# Patient Record
Sex: FEMALE | Race: WHITE | NOT HISPANIC OR LATINO | Employment: OTHER | URBAN - METROPOLITAN AREA
[De-identification: names, ages, dates, MRNs, and addresses within clinical notes are randomized per-mention and may not be internally consistent; named-entity substitution may affect disease eponyms.]

---

## 2017-01-16 ENCOUNTER — OFFICE VISIT - RIVER FALLS (OUTPATIENT)
Dept: FAMILY MEDICINE | Facility: CLINIC | Age: 57
End: 2017-01-16

## 2017-01-16 ASSESSMENT — MIFFLIN-ST. JEOR: SCORE: 1293.44

## 2017-01-21 ENCOUNTER — TRANSFERRED RECORDS (OUTPATIENT)
Dept: HEALTH INFORMATION MANAGEMENT | Facility: CLINIC | Age: 57
End: 2017-01-21

## 2017-01-21 LAB — HPV ABSTRACT: NORMAL

## 2017-01-27 ENCOUNTER — OFFICE VISIT - RIVER FALLS (OUTPATIENT)
Dept: FAMILY MEDICINE | Facility: CLINIC | Age: 57
End: 2017-01-27

## 2017-01-27 ASSESSMENT — MIFFLIN-ST. JEOR: SCORE: 1293.44

## 2017-02-13 ENCOUNTER — OFFICE VISIT - RIVER FALLS (OUTPATIENT)
Dept: FAMILY MEDICINE | Facility: CLINIC | Age: 57
End: 2017-02-13

## 2017-04-03 ENCOUNTER — OFFICE VISIT - RIVER FALLS (OUTPATIENT)
Dept: FAMILY MEDICINE | Facility: CLINIC | Age: 57
End: 2017-04-03

## 2017-04-03 ASSESSMENT — MIFFLIN-ST. JEOR: SCORE: 1302.51

## 2017-09-18 ENCOUNTER — OFFICE VISIT - RIVER FALLS (OUTPATIENT)
Dept: FAMILY MEDICINE | Facility: CLINIC | Age: 57
End: 2017-09-18

## 2017-10-12 ENCOUNTER — OFFICE VISIT - RIVER FALLS (OUTPATIENT)
Dept: FAMILY MEDICINE | Facility: CLINIC | Age: 57
End: 2017-10-12

## 2017-10-12 ENCOUNTER — COMMUNICATION - RIVER FALLS (OUTPATIENT)
Dept: FAMILY MEDICINE | Facility: CLINIC | Age: 57
End: 2017-10-12

## 2018-01-16 ENCOUNTER — OFFICE VISIT - RIVER FALLS (OUTPATIENT)
Dept: FAMILY MEDICINE | Facility: CLINIC | Age: 58
End: 2018-01-16

## 2018-01-17 LAB
CREAT SERPL-MCNC: 0.71 MG/DL (ref 0.5–1.05)
GLUCOSE BLD-MCNC: 114 MG/DL (ref 65–99)

## 2018-10-24 ENCOUNTER — OFFICE VISIT - RIVER FALLS (OUTPATIENT)
Dept: FAMILY MEDICINE | Facility: CLINIC | Age: 58
End: 2018-10-24

## 2018-10-24 ASSESSMENT — MIFFLIN-ST. JEOR: SCORE: 1261.69

## 2019-03-28 ENCOUNTER — OFFICE VISIT - RIVER FALLS (OUTPATIENT)
Dept: FAMILY MEDICINE | Facility: CLINIC | Age: 59
End: 2019-03-28

## 2019-03-28 ASSESSMENT — MIFFLIN-ST. JEOR: SCORE: 1270.76

## 2019-03-30 LAB
BUN SERPL-MCNC: 11 MG/DL (ref 7–25)
BUN/CREAT RATIO - HISTORICAL: ABNORMAL (ref 6–22)
CALCIUM SERPL-MCNC: 9.7 MG/DL (ref 8.6–10.4)
CHLORIDE BLD-SCNC: 102 MMOL/L (ref 98–110)
CHOLEST SERPL-MCNC: 264 MG/DL
CHOLEST/HDLC SERPL: 5.1 {RATIO}
CO2 SERPL-SCNC: 27 MMOL/L (ref 20–32)
CREAT SERPL-MCNC: 0.61 MG/DL (ref 0.5–1.05)
EGFRCR SERPLBLD CKD-EPI 2021: 99 ML/MIN/1.73M2
GLUCOSE BLD-MCNC: 111 MG/DL (ref 65–99)
HBA1C MFR BLD: 7.9 %
HDLC SERPL-MCNC: 52 MG/DL
LDLC SERPL CALC-MCNC: 179 MG/DL
NONHDLC SERPL-MCNC: 212 MG/DL
POTASSIUM BLD-SCNC: 3.9 MMOL/L (ref 3.5–5.3)
SODIUM SERPL-SCNC: 138 MMOL/L (ref 135–146)
TRIGL SERPL-MCNC: 176 MG/DL

## 2019-04-18 ENCOUNTER — OFFICE VISIT - RIVER FALLS (OUTPATIENT)
Dept: FAMILY MEDICINE | Facility: CLINIC | Age: 59
End: 2019-04-18

## 2019-04-18 ASSESSMENT — MIFFLIN-ST. JEOR: SCORE: 1257.15

## 2019-05-30 ENCOUNTER — OFFICE VISIT - RIVER FALLS (OUTPATIENT)
Dept: FAMILY MEDICINE | Facility: CLINIC | Age: 59
End: 2019-05-30

## 2019-05-30 ASSESSMENT — MIFFLIN-ST. JEOR: SCORE: 1237.19

## 2019-09-14 ENCOUNTER — AMBULATORY - RIVER FALLS (OUTPATIENT)
Dept: FAMILY MEDICINE | Facility: CLINIC | Age: 59
End: 2019-09-14

## 2019-09-15 ENCOUNTER — COMMUNICATION - RIVER FALLS (OUTPATIENT)
Dept: FAMILY MEDICINE | Facility: CLINIC | Age: 59
End: 2019-09-15

## 2019-09-15 LAB
CHOLEST SERPL-MCNC: 229 MG/DL
CHOLEST/HDLC SERPL: 4.2 {RATIO}
CREAT UR-MCNC: 30 MG/DL (ref 20–275)
HBA1C MFR BLD: 5.8 %
HDLC SERPL-MCNC: 54 MG/DL
LDLC SERPL CALC-MCNC: 144 MG/DL
MICROALBUMIN UR-MCNC: <0.2 MG/DL
MICROALBUMIN/CREAT UR: NORMAL MG/G{CREAT}
NONHDLC SERPL-MCNC: 175 MG/DL
TRIGL SERPL-MCNC: 174 MG/DL

## 2019-09-19 ENCOUNTER — OFFICE VISIT - RIVER FALLS (OUTPATIENT)
Dept: FAMILY MEDICINE | Facility: CLINIC | Age: 59
End: 2019-09-19

## 2020-06-26 ENCOUNTER — AMBULATORY - RIVER FALLS (OUTPATIENT)
Dept: FAMILY MEDICINE | Facility: CLINIC | Age: 60
End: 2020-06-26

## 2020-06-27 LAB
BUN SERPL-MCNC: 17 MG/DL (ref 7–25)
BUN/CREAT RATIO - HISTORICAL: ABNORMAL (ref 6–22)
CALCIUM SERPL-MCNC: 9.9 MG/DL (ref 8.6–10.4)
CHLORIDE BLD-SCNC: 103 MMOL/L (ref 98–110)
CO2 SERPL-SCNC: 27 MMOL/L (ref 20–32)
CREAT SERPL-MCNC: 0.8 MG/DL (ref 0.5–0.99)
EGFRCR SERPLBLD CKD-EPI 2021: 80 ML/MIN/1.73M2
GLUCOSE BLD-MCNC: 117 MG/DL (ref 65–99)
HBA1C MFR BLD: 6 %
POTASSIUM BLD-SCNC: 4.2 MMOL/L (ref 3.5–5.3)
SODIUM SERPL-SCNC: 138 MMOL/L (ref 135–146)

## 2020-07-03 ENCOUNTER — COMMUNICATION - RIVER FALLS (OUTPATIENT)
Dept: FAMILY MEDICINE | Facility: CLINIC | Age: 60
End: 2020-07-03

## 2020-08-03 ENCOUNTER — COMMUNICATION - RIVER FALLS (OUTPATIENT)
Dept: FAMILY MEDICINE | Facility: CLINIC | Age: 60
End: 2020-08-03

## 2020-08-11 ENCOUNTER — OFFICE VISIT - RIVER FALLS (OUTPATIENT)
Dept: FAMILY MEDICINE | Facility: CLINIC | Age: 60
End: 2020-08-11

## 2020-08-11 ASSESSMENT — MIFFLIN-ST. JEOR: SCORE: 1207.26

## 2021-09-18 ENCOUNTER — AMBULATORY - RIVER FALLS (OUTPATIENT)
Dept: FAMILY MEDICINE | Facility: CLINIC | Age: 61
End: 2021-09-18

## 2021-09-19 ENCOUNTER — COMMUNICATION - RIVER FALLS (OUTPATIENT)
Dept: FAMILY MEDICINE | Facility: CLINIC | Age: 61
End: 2021-09-19

## 2021-09-19 LAB
BUN SERPL-MCNC: 11 MG/DL (ref 7–25)
BUN/CREAT RATIO - HISTORICAL: ABNORMAL (ref 6–22)
CALCIUM SERPL-MCNC: 9.8 MG/DL (ref 8.6–10.4)
CHLORIDE BLD-SCNC: 104 MMOL/L (ref 98–110)
CHOLEST SERPL-MCNC: 141 MG/DL
CHOLEST/HDLC SERPL: 3.1 {RATIO}
CO2 SERPL-SCNC: 29 MMOL/L (ref 20–32)
CREAT SERPL-MCNC: 0.77 MG/DL (ref 0.5–0.99)
CREAT UR-MCNC: 16 MG/DL (ref 20–275)
EGFRCR SERPLBLD CKD-EPI 2021: 83 ML/MIN/1.73M2
GLUCOSE BLD-MCNC: 105 MG/DL (ref 65–99)
HBA1C MFR BLD: 6.1 %
HDLC SERPL-MCNC: 45 MG/DL
LDLC SERPL CALC-MCNC: 74 MG/DL
MICROALBUMIN UR-MCNC: <0.2 MG/DL
MICROALBUMIN/CREAT UR: ABNORMAL MG/G{CREAT}
NONHDLC SERPL-MCNC: 96 MG/DL
POTASSIUM BLD-SCNC: 4.4 MMOL/L (ref 3.5–5.3)
SODIUM SERPL-SCNC: 140 MMOL/L (ref 135–146)
TRIGL SERPL-MCNC: 137 MG/DL

## 2021-09-20 ENCOUNTER — COMMUNICATION - RIVER FALLS (OUTPATIENT)
Dept: FAMILY MEDICINE | Facility: CLINIC | Age: 61
End: 2021-09-20

## 2021-09-23 ENCOUNTER — OFFICE VISIT - RIVER FALLS (OUTPATIENT)
Dept: FAMILY MEDICINE | Facility: CLINIC | Age: 61
End: 2021-09-23

## 2021-09-23 ASSESSMENT — MIFFLIN-ST. JEOR: SCORE: 1185.48

## 2022-02-11 VITALS
WEIGHT: 177.6 LBS | HEIGHT: 60 IN | SYSTOLIC BLOOD PRESSURE: 158 MMHG | HEART RATE: 76 BPM | BODY MASS INDEX: 34.69 KG/M2 | HEART RATE: 114 BPM | SYSTOLIC BLOOD PRESSURE: 153 MMHG | SYSTOLIC BLOOD PRESSURE: 174 MMHG | DIASTOLIC BLOOD PRESSURE: 100 MMHG | TEMPERATURE: 98.3 F | DIASTOLIC BLOOD PRESSURE: 95 MMHG | DIASTOLIC BLOOD PRESSURE: 78 MMHG | HEIGHT: 60 IN | WEIGHT: 179 LBS | BODY MASS INDEX: 35.14 KG/M2 | WEIGHT: 179 LBS | TEMPERATURE: 98.3 F | BODY MASS INDEX: 35.14 KG/M2 | HEART RATE: 90 BPM

## 2022-02-11 VITALS
HEART RATE: 125 BPM | DIASTOLIC BLOOD PRESSURE: 64 MMHG | DIASTOLIC BLOOD PRESSURE: 74 MMHG | SYSTOLIC BLOOD PRESSURE: 136 MMHG | HEIGHT: 60 IN | HEART RATE: 84 BPM | TEMPERATURE: 98.8 F | WEIGHT: 160 LBS | BODY MASS INDEX: 31.41 KG/M2 | SYSTOLIC BLOOD PRESSURE: 140 MMHG

## 2022-02-11 VITALS
SYSTOLIC BLOOD PRESSURE: 138 MMHG | DIASTOLIC BLOOD PRESSURE: 83 MMHG | BODY MASS INDEX: 34.16 KG/M2 | DIASTOLIC BLOOD PRESSURE: 78 MMHG | HEART RATE: 97 BPM | SYSTOLIC BLOOD PRESSURE: 144 MMHG | HEIGHT: 60 IN | HEART RATE: 109 BPM | WEIGHT: 174 LBS | BODY MASS INDEX: 33.57 KG/M2 | HEIGHT: 60 IN | WEIGHT: 171 LBS

## 2022-02-11 VITALS
HEIGHT: 60 IN | BODY MASS INDEX: 30.47 KG/M2 | OXYGEN SATURATION: 97 % | TEMPERATURE: 98.6 F | WEIGHT: 155.2 LBS | HEART RATE: 118 BPM | SYSTOLIC BLOOD PRESSURE: 118 MMHG | DIASTOLIC BLOOD PRESSURE: 78 MMHG

## 2022-02-11 VITALS
BODY MASS INDEX: 31.01 KG/M2 | HEART RATE: 88 BPM | WEIGHT: 158.8 LBS | TEMPERATURE: 98.1 F | SYSTOLIC BLOOD PRESSURE: 130 MMHG | DIASTOLIC BLOOD PRESSURE: 76 MMHG

## 2022-02-11 VITALS
TEMPERATURE: 98.6 F | DIASTOLIC BLOOD PRESSURE: 100 MMHG | BODY MASS INDEX: 35.53 KG/M2 | WEIGHT: 181 LBS | HEIGHT: 60 IN | OXYGEN SATURATION: 98 % | HEART RATE: 103 BPM | SYSTOLIC BLOOD PRESSURE: 158 MMHG

## 2022-02-11 VITALS
TEMPERATURE: 98 F | WEIGHT: 172 LBS | HEART RATE: 84 BPM | BODY MASS INDEX: 33.77 KG/M2 | SYSTOLIC BLOOD PRESSURE: 182 MMHG | HEIGHT: 60 IN | DIASTOLIC BLOOD PRESSURE: 84 MMHG

## 2022-02-11 VITALS
SYSTOLIC BLOOD PRESSURE: 140 MMHG | HEIGHT: 60 IN | WEIGHT: 166.6 LBS | BODY MASS INDEX: 32.71 KG/M2 | DIASTOLIC BLOOD PRESSURE: 79 MMHG

## 2022-02-11 VITALS
WEIGHT: 177 LBS | DIASTOLIC BLOOD PRESSURE: 88 MMHG | SYSTOLIC BLOOD PRESSURE: 186 MMHG | HEART RATE: 113 BPM | BODY MASS INDEX: 35.45 KG/M2 | TEMPERATURE: 98.5 F

## 2022-02-11 VITALS
WEIGHT: 179.8 LBS | SYSTOLIC BLOOD PRESSURE: 149 MMHG | HEART RATE: 78 BPM | BODY MASS INDEX: 36.13 KG/M2 | TEMPERATURE: 98.5 F | HEART RATE: 109 BPM | DIASTOLIC BLOOD PRESSURE: 88 MMHG | BODY MASS INDEX: 36.01 KG/M2 | WEIGHT: 180.4 LBS | SYSTOLIC BLOOD PRESSURE: 168 MMHG | DIASTOLIC BLOOD PRESSURE: 82 MMHG

## 2022-02-16 NOTE — NURSING NOTE
Comprehensive Intake Entered On:  3/28/2019 6:04 PM CDT    Performed On:  3/28/2019 6:02 PM CDT by Dorothea Sewell MA               Summary   Chief Complaint :   med refill   Menstrual Status :   Postmenopausal   Weight Measured :   174 lb(Converted to: 174 lb 0 oz, 78.93 kg)    Height Measured :   60 in(Converted to: 5 ft 0 in, 152.40 cm)    Body Mass Index :   33.98 kg/m2 (HI)    Body Surface Area :   1.83 m2   Systolic Blood Pressure :   138 mmHg (HI)    Diastolic Blood Pressure :   78 mmHg   Mean Arterial Pressure :   98 mmHg   Peripheral Pulse Rate :   109 bpm (HI)    Dorothea Sewell MA - 3/28/2019 6:02 PM CDT   Health Status   Allergies Verified? :   Yes   Medication History Verified? :   Yes   Dorothea Sewell MA - 3/28/2019 6:02 PM CDT   Meds / Allergies   (As Of: 3/28/2019 6:04:24 PM CDT)   Allergies (Active)   adhesive tape  Estimated Onset Date:   Unspecified ; Created By:   Liv Virk; Reaction Status:   Active ; Category:   Drug ; Substance:   adhesive tape ; Type:   Allergy ; Updated By:   Liv Virk; Reviewed Date:   1/16/2018 5:58 PM CST      codeine  Estimated Onset Date:   Unspecified ; Reactions:   Nausea. ; Created By:   Aziza Espinal; Reaction Status:   Active ; Category:   Drug ; Substance:   codeine ; Type:   Allergy ; Updated By:   Aziza Espinal; Reviewed Date:   1/16/2018 5:58 PM CST        Medication List   (As Of: 3/28/2019 6:04:24 PM CDT)   Prescription/Discharge Order    amlodipine-valsartan  :   amlodipine-valsartan ; Status:   Prescribed ; Ordered As Mnemonic:   amlodipine-valsartan 5 mg-160 mg oral tablet ; Simple Display Line:   1 tab(s), Oral, daily, office visit before next refill please, 30 tab(s), 0 Refill(s) ; Ordering Provider:   Marcelle Nash MD; Catalog Code:   amlodipine-valsartan ; Order Dt/Tm:   2/7/2019 1:42:04 PM            Home Meds    amoxicillin  :   amoxicillin ; Status:   Documented ; Ordered As Mnemonic:   amoxicillin ; Simple Display Line:   Oral, tid,  for abscess tooth 10/24/18, 0 Refill(s) ; Catalog Code:   amoxicillin ; Order Dt/Tm:   10/24/2018 8:55:00 AM          aspirin  :   aspirin ; Status:   Documented ; Ordered As Mnemonic:   Aspir 81 ; Simple Display Line:   81 mg, po, daily, 0 Refill(s) ; Catalog Code:   aspirin ; Order Dt/Tm:   10/24/2016 5:36:51 PM          calcium carbonate  :   calcium carbonate ; Status:   Documented ; Ordered As Mnemonic:   calcium carbonate ; Simple Display Line:   600 mg, Oral, bid, 0 Refill(s) ; Catalog Code:   calcium carbonate ; Order Dt/Tm:   1/16/2018 5:52:27 PM          cetirizine  :   cetirizine ; Status:   Documented ; Ordered As Mnemonic:   ZyrTEC ; Simple Display Line:   10 mg, Oral, daily, 0 Refill(s) ; Catalog Code:   cetirizine ; Order Dt/Tm:   10/24/2018 8:56:48 AM          cholecalciferol  :   cholecalciferol ; Status:   Documented ; Ordered As Mnemonic:   Vitamin D3 2000 intl units oral tablet ; Simple Display Line:   2,000 International Unit, 1 tab(s), po, daily ; Catalog Code:   cholecalciferol ; Order Dt/Tm:   4/2/2015 6:27:01 PM          Miscellaneous Prescription  :   Miscellaneous Prescription ; Status:   Documented ; Ordered As Mnemonic:   Multivitamin ; Catalog Code:   Miscellaneous Prescription ; Order Dt/Tm:   4/2/2015 6:27:14 PM          Miscellaneous Prescription  :   Miscellaneous Prescription ; Status:   Documented ; Ordered As Mnemonic:   non-medicated sleep aid ; Simple Display Line:   1 tab, Oral, hs, 0 Refill(s) ; Catalog Code:   Miscellaneous Prescription ; Order Dt/Tm:   10/24/2018 8:57:52 AM

## 2022-02-16 NOTE — TELEPHONE ENCOUNTER
Entered by Bella Moore CMA on June 24, 2020 8:21:09 AM CDT  ---------------------  From: Bella Moore CMA   To: [email or web address] #[id number]    Sent: 6/24/2020 8:21:09 AM CDT  Subject: FW: Medication Management     ** Submitted: **  Order:amlodipine-valsartan (amlodipine-valsartan 5 mg-160 mg oral tablet)  1 tab(s)  Oral  daily  Qty:  30 tab(s)        Refills:  0          HUEY     Route To [email or web address] #[id number]    Signed by Bella Moore CMA  6/24/2020 1:20:00 PM [facility]    ** Submitted: **  Complete:amlodipine-valsartan (amlodipine-valsartan 5 mg-160 mg oral tablet)   Signed by Bella Moore CMA  6/24/2020 1:21:00 PM [facility]    ** Not Approved:  **  amlodipine-valsartan (AMLODIPINE-VALSARTAN 5-160MG TABS)  TAKE 1 TABLET BY MOUTH DAILY  Qty:  90 tab(s)        Days Supply:  90        Refills:  0          HUEY     Route To [email or web address] #[id number]   Signed by Bella Moore CMA          pt has telemed visit 7/1 with PARUL      ---------------------  From: Bella Moore CMA (eRx Pool ([id number]_[facility]))   To: [facility] Message Pool ([id number]_[place] [facility] [place]);     Sent: 6/23/2020 10:01:57 AM CDT  Subject: FW: Medication Management   Due Date/Time: 6/23/2020 2:12:00 PM CDT           Entered by Jasmyne Loving CMA on June 22, 2020 3:16:48 PM CDT  HOLD for appt on 7/1/2020      ------------------------------------------  From: [email or web address] #[id number]  To: Gaurang Correa MD  Sent: June 22, 2020 2:12:49 PM CDT  Subject: Medication Management  Due: June 17, 2020 4:18:41 PM CDT     ** On Hold Pending Signature **     Dispensed Drug: amlodipine-valsartan (amlodipine-valsartan 5 mg-160 mg oral tablet), TAKE 1 TABLET BY MOUTH DAILY  Quantity: 90 tab(s)  Days Supply: 90  Refills: 0  Substitutions Allowed  Notes from Pharmacy:  ------------------------------------------

## 2022-02-16 NOTE — PROCEDURES
Accession Number:       [id number]  CLINICAL INFORMATION::     Normal exam  LMP::     POSTMENOPAUSAL  PREV. PAP::     1/16/17  PREV. BX::     NONE GIVEN  SOURCE::     Cervix  STATEMENT OF ADEQUACY::     Satisfactory for evaluation. Endocervical/transformation zone component present.  INTERPRETATION/RESULT::     Negative for intraepithelial lesion or malignancy.  COMMENT::     This Pap test has been evaluated with computer assisted technology.  CYTOTECHNOLOGIST::     KYARA YOON(ASCP) CT Screening location: [place] [id number]  COMMENT:     See comment       EXPLANATORY NOTE:         The Pap is a screening test for cervical cancer. It is       not a diagnostic test and is subject to false negative       and false positive results. It is most reliable when a       satisfactory sample, regularly obtained, is submitted       with relevant clinical findings and history, and when       the Pap result is evaluated along with historic and       current clinical information.

## 2022-02-16 NOTE — TELEPHONE ENCOUNTER
Order, notes and insurance card are faxed to [facility] and they will contact patient.

## 2022-02-16 NOTE — PROGRESS NOTES
Chief Complaint    med refill  History of Present Illness      Patient with history of hypertension, dyslipidemia, prediabetes presents for refill of her blood pressure medication.  She brings readings from home showing good control of her blood pressure.  She is felt well.  No complaints.  Review of Systems      No headache, chest pain, dyspnea, edema.  No visual complaints.  Physical Exam   Vitals & Measurements    HR: 109(Peripheral)  BP: 138/78     HT: 60 in  WT: 174 lb  BMI: 33.98       Patient is an overweight but otherwise healthy-appearing woman in no distress.  Eyes appear normal.  HEENT exam is unremarkable.  Neck supple no adenopathy or thyromegaly.  Carotid pulsations normal.  Chest clear to auscultation and percussion.  Cardiac exam is regular no murmur gallop.  No edema.  Pulses palpable in the feet.  Assessment/Plan       Dyslipidemia (E78.5)         Lipid panel today.         Ordered:          55549 office outpatient new 45 minutes (Charge), Quantity: 1, White coat syndrome with hypertension  Dyslipidemia  Prediabetes          Lipid panel with reflex to direct ldl* ([facility]), Specimen Type: Serum, Collection Date: 03/28/19 18:17:00 CDT                Prediabetes (R73.03)         BMP and hemoglobin A1c today.         Ordered:          50514 office outpatient new 45 minutes (Charge), Quantity: 1, White coat syndrome with hypertension  Dyslipidemia  Prediabetes          Hemoglobin A1c* ([facility]), Specimen Type: Blood, Collection Date: 03/28/19 18:17:00 CDT                White coat syndrome with hypertension (I10)         Blood pressure is controlled by home readings.  BMP today.  Refill her medication for a year.         Ordered:          97237 office outpatient new 45 minutes (Charge), Quantity: 1, White coat syndrome with hypertension  Dyslipidemia  Prediabetes          Basic Metabolic Panel* ([facility]), Specimen Type: Serum, Collection Date: 03/28/19 18:17:00 CDT                Orders:          amlodipine-valsartan, 1 tab(s), Oral, daily, Instructions: office visit before next refill please, # 90 tab(s), 3 Refill(s), HUEY, Type: Maintenance, Pharmacy: [id number], 1 tab(s) Oral daily,Instr:office visit before next refill please, (Ordered)         Return to Clinic (Request), RFV: Annual BMP, lipids, Hgb A1c  Patient Information     Name:KENDAL HILARIO      Address:      [address] [place] [id number]     Sex:Female     YOB: 1960     [phone]     Emergency Contact:CAROLYNN HILARIO     MRN:[id number]     FIN:[id number]     Location:[facility]     Date of Service:2019      Primary Care Physician:       Marcelle Nash MD, [phone]      Attending Physician:       Yoav Vega MD, [phone]  Problem List/Past Medical History    Ongoing     Acute low back pain     Allergic rhinitis     Breast cancer     Closed fracture of left distal radius and ulna     Depression, recurrent     Dyslipidemia     Obese     Prediabetes     Spotting between menses     White coat syndrome with hypertension    Historical     Pregnancy     Pregnancy     Pregnancy  Procedure/Surgical History     Colonoscopy (10/18/2013)      Comments: Normal colonoscopy, repeat in 5 years due to family hx colon cancer.     Right and left skin-sparing mastectomy (2012)     Banks lymph node biopsy, right and left axilla (2012)     Sphincteroplasty/perineoplasty (2002)      Comments: [facility]/Chevy Muñoz MD.     Left wrist surgery       - Spontaneous vaginal delivery      Comments: x 3 (,  and ).  Medications        Vitamin D3 2000 intl units oral tablet: 2,000 International Unit, 1 tab(s), po, daily.        Multivitamin: Multivitamin, Supply, 0 Refill(s), Type: Maintenance.        Aspir 81: 81 mg, po, daily, 0 Refill(s).        calcium carbonate: 600 mg, Oral, bid, 0 Refill(s).        amoxicillin: Oral, tid, for  abscess tooth 10/24/18, 0 Refill(s).        ZyrTEC: 10 mg, Oral, daily, 0 Refill(s).        non-medicated sleep aid: 1 tab, Oral, hs, 0 Refill(s).        amlodipine-valsartan 5 mg-160 mg oral tablet: 1 tab(s), Oral, daily, office visit before next refill please, 90 tab(s), 3 Refill(s).         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 Exercise type: Walking., 08/06/2013     Home and Environment      Marital status: . Spouse/Partner name: Bulmaro. 3 children., 08/06/2013     Nutrition and Health      Type of diet: Regular., 11/12/2018     Sexual      Sexually active: No. Identifies as female, Sexual orientation: Straight or heterosexual., 11/12/2018     Substance Abuse      Never, 05/10/2011     Tobacco      Never, 05/10/2011  Family History    CA - Cancer of colon: Father.    Colon polyps.: Aunt.    Diabetes mellitus: Sister.  Immunizations      Vaccine Date Status Comments      tetanus/diphth/pertuss (Tdap) adult/adol 05/01/2012 Given Pt gave verbal consent.      influenza 10/23/2003 Recorded      Td 08/05/2003 Recorded      tetanus 01/11/2000 Recorded      Td 08/08/1983 Recorded

## 2022-02-16 NOTE — TELEPHONE ENCOUNTER
---------------------  From: Gaurang Correa MD   To: KENDAL HILARIO    Sent: 7/3/2020 8:28:49 AM CDT  Subject: Patient Message - Results Notification         Labs for your review.  We can discuss in more detail at future clinic visit.     Results:  Date Result Name Ind Value Ref Range   6/26/2020 8:33 AM Sodium Level  138 mmol/L (135 - 146)   6/26/2020 8:33 AM Potassium Level  4.2 mmol/L (3.5 - 5.3)   6/26/2020 8:33 AM Chloride Level  103 mmol/L (98 - 110)   6/26/2020 8:33 AM CO2 Level  27 mmol/L (20 - 32)   6/26/2020 8:33 AM Glucose Level ((H)) 117 mg/dL (65 - 99)   6/26/2020 8:33 AM BUN  17 mg/dL (7 - 25)   6/26/2020 8:33 AM Creatinine Level  0.80 mg/dL (0.50 - 0.99)   6/26/2020 8:33 AM BUN/Creat Ratio  NOT APPLICABLE (6 - 22)   6/26/2020 8:33 AM eGFR  80 mL/min/1.73m2 (> OR = 60 - )   6/26/2020 8:33 AM eGFR African American  93 mL/min/1.73m2 (> OR = 60 - )   6/26/2020 8:33 AM Calcium Level  9.9 mg/dL (8.6 - 10.4)   6/26/2020 8:33 AM Hgb A1c ((H)) 6.0 ( - <5.7)

## 2022-02-16 NOTE — PROGRESS NOTES
Patient:   KENDAL HILARIO            MRN: [id number]            FIN: [id number]               Age:   57 years     Sex:  Female     :  1960   Associated Diagnoses:   Sore throat; HTN (Hypertension)   Author:   Tegan Wakefield      Visit Information      Date of Service: 10/12/2017 05:34 pm  Performing Location: [facility]  Encounter#: [id number]      Primary Care Provider (PCP):  Jake Monsalve MD    NPI# [id number]      Referring Provider:  Tegan Wakefield    NPI# [id number]      Chief Complaint   10/12/2017 5:47 PM CDT   Patient exposed to strep, does . 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  Patient states she had fever two days ago, with scratchy throat for past two days. No OTC treatments.  Denies cough. Concerned she has strep and will give to other children in her care.       Review of Systems   Constitutional:  Negative.    Eye:  Negative.    Ear/Nose/Mouth/Throat:  Negative except as documented in history of present illness.    Respiratory:  Negative.    Cardiovascular:  feels her blood pressure is not well controlled.    Gastrointestinal:  Negative.    Genitourinary:  Negative.    Gynecologic:  Negative.    Hematology/Lymphatics:  Negative.    Endocrine:  Negative.    Immunologic:  Negative.    Musculoskeletal:  Negative.    Integumentary:  Negative.    Neurologic:  Negative.    Psychiatric:  Negative.              Health Status   Allergies:    Allergic Reactions (Selected)  Severity Not Documented  Adhesive tape (No reactions were documented)  Codeine (Nausea.)   Problem list:    All Problems  Acute Low Back Pain / ICD-9-.2 / Confirmed  Allergic Rhinitis / ICD-9- / Confirmed  Breast Cancer / ICD-9-.9 / Confirmed  Closed fracture of left distal radius and ulna / ICD-9-.44 / Confirmed  Depression, Recurrent / ICD-9-CM  296.30 / Confirmed  HTN (Hypertension) / ICD-9-.9 / Confirmed  Obese / ICD-9-.00 / Probable  Spotting between Menses / ICD-9-.6 / Confirmed   Medications:  (Selected)   Prescriptions  Prescribed  amlodipine-valsartan 5 mg-160 mg oral tablet: 1 tab(s), po, daily, # 30 tab(s), 1 Refill(s), Type: Maintenance, Pharmacy: [facility] #[id number], 1 tab(s) po daily  penicillin V potassium 500 mg oral tablet: 1 tab(s) ( 500 mg ), po, tid, # 30 tab(s), 0 Refill(s), Type: Maintenance, Pharmacy: [facility] #[id number], 1 tab(s) po tid,x10 day(s)  Documented Medications  Documented  Aspir 81: ( 81 mg ), po, daily, 0 Refill(s), Type: Maintenance  Multivitamin: Multivitamin, Supply, 0 Refill(s), Type: Maintenance  Vitamin D3 2000 intl units oral tablet: 1 tab(s) ( 2,000 International Unit ), po, daily, 0 Refill(s), Type: Maintenance      Histories   Past Medical History:    Active  Depression, Recurrent (296.30)   Family History:    Diabetes mellitus  Sister  CA - Cancer of colon  Father ()  Colon polyps.  Aunt     Procedure history:    Colonoscopy ([id number]) on 10/18/2013 at 53 Years.  Comments:  10/30/2013 7:40 AM - Zia Eng MA  Normal colonoscopy, repeat in 5 years due to family hx colon cancer  Right and left skin-sparing mastectomy on 2012 at 51 Years.  Lambert lymph node biopsy, right and left axilla on 2012 at 51 Years.  Sphincteroplasty/perineoplasty on 2002 at 41 Years.  Comments:  2012 12:00 PM - Aziza Espinal  [facility]/Chevy Muñoz MD   - Spontaneous vaginal delivery ([id number]).  Comments:  5/10/2011 10:04 AM - Teresa PICKENS, Bella  x 3 (,  and )  Left wrist surgery .   Social History:        Alcohol Assessment            Current, 1-2 times per month, 1 drinks/episode average.      Tobacco Assessment            Never      Substance Abuse Assessment            Never      Employment and Education Assessment            Employed, Work/School  description: [employer].      Home and Environment Assessment            Marital status: .  Spouse/Partner name: Bulmaro.  Ale children.      Exercise and Physical Activity Assessment            Exercise frequency: 3-4 times/week.  Exercise type: Walking.      Sexual Assessment            Sexually active: Yes.  Sexual orientation: Heterosexual.        Physical Examination   Vital Signs   10/12/2017 5:47 PM CDT Temperature Tympanic 98.5 DegF    Peripheral Pulse Rate 109 bpm  HI    Pulse Site Brachial artery    HR Method Electronic    Systolic Blood Pressure 149 mmHg  HI    Diastolic Blood Pressure 82 mmHg    Mean Arterial Pressure 104 mmHg    BP Site Right arm    BP Method Electronic      Measurements from flowsheet : Measurements   10/12/2017 5:47 PM CDT   Weight Measured - Standard                179.8 lb     General:  Alert and oriented, No acute distress.    Eye:  Normal conjunctiva.    HENT:  Normocephalic, Tympanic membranes are clear, Oral mucosa is moist, No pharyngeal erythema.    Neck:  Supple, Non-tender, No lymphadenopathy.    Respiratory:  Lungs are clear to auscultation, Respirations are non-labored, Breath sounds are equal, Symmetrical chest wall expansion.         Pattern: Regular.    Cardiovascular:  Normal rate, Regular rhythm, No murmur.    Musculoskeletal:  Normal range of motion, Normal strength, Normal gait.    Integumentary:  Warm, Dry, No rash.    Neurologic:  Alert, Oriented.    Psychiatric:  Cooperative.       Health Maintenance   Immunization schedule:  declines flu vaccine .       Review / Management   Results review:  Lab results: 10/12/2017 6:00 PM CDT   Group A Strep POC         NOT DETECTED, Will be sent culture.       Impression and Plan   Diagnosis     Sore throat (YYB55-KW J02.9).     HTN (Hypertension) (SYY92-PF I10).     Course:  Patient felt strongly that she needed to be treated for strep.    Plan:  Encouraged patient to use warm salt water gargles  OTC  analgesics as directed on package as needed for discomfort  Will need to be off work x 24 hours after initiation of antibiotics, Return to clinic in two months for recheck of BP.    Patient Instructions:       Counseled: Patient, Regarding diagnosis, Regarding treatment, Regarding medications, Verbalized understanding.    Orders     Orders (Selected)   Prescriptions  Prescribed  amlodipine-valsartan 5 mg-160 mg oral tablet: 1 tab(s), po, daily, # 30 tab(s), 1 Refill(s), Type: Maintenance, Pharmacy: [facility] #[id number], 1 tab(s) po daily  penicillin V potassium 500 mg oral tablet: 1 tab(s) ( 500 mg ), po, tid, # 30 tab(s), 0 Refill(s), Type: Maintenance, Pharmacy: [facility] #[id number], 1 tab(s) po tid,x10 day(s).

## 2022-02-16 NOTE — TELEPHONE ENCOUNTER
Entered by Marcelle Nash MD on January 04, 2019 4:02:13 PM CST  ---------------------  From: Marcelle Nash MD   To: [id number]    Sent: 1/4/2019 4:02:13 PM CST  Subject: Medication Management     ** Submitted: **  Order:amlodipine-valsartan (amlodipine-valsartan 5 mg-160 mg oral tablet)  1 tab(s)  Oral  daily  office visit before next refill please  Qty:  30 tab(s)        Refills:  0          HUEY     Route To [id number]    Signed by Marcelle Nash MD  1/4/2019 4:01:00 PM    ** Submitted: **  Complete:amlodipine-valsartan (amlodipine-valsartan 5 mg-160 mg oral tablet)   Signed by Marcelle Nash MD  1/4/2019 4:02:00 PM    ** Not Approved:  **  amlodipine-valsartan (Amlodipine Besylate-Valsartan Oral Tablet 5-160 MG)  TAKE ONE TABLET BY MOUTH ONE TIME DAILY  Qty:  90 tab(s)        Days Supply:  90        Refills:  2          HUEY     Route To [facility] #[id number]               ------------------------------------------  From: [facility][id number]  To: Marcelle Nash MD  Sent: January 3, 2019 11:41:24 AM CST  Subject: Medication Management  Due: January 4, 2019 11:41:24 AM CST    ** On Hold Pending Signature **  Drug: amlodipine-valsartan (amlodipine-valsartan 5 mg-160 mg oral tablet)  TAKE ONE TABLET BY MOUTH ONE TIME DAILY   Quantity: 90 tab(s)     Days Supply: 90        Refills: 2  Substitutions Allowed  Notes from Pharmacy:     Dispensed Drug: amlodipine-valsartan (amlodipine-valsartan 5 mg-160 mg oral tablet)  TAKE ONE TABLET BY MOUTH ONE TIME DAILY   Quantity: 90 tab(s)     Days Supply: 90        Refills: 2  Substitutions Allowed  Notes from Pharmacy:   ------------------------------------------

## 2022-02-16 NOTE — TELEPHONE ENCOUNTER
Order, notes and insurance card are sent to [facility] and they will contact patient and schedule.

## 2022-02-16 NOTE — PROGRESS NOTES
Patient:   KENDAL HILARIO            MRN: [id number]            FIN: [id number]               Age:   58 years     Sex:  Female     :  1960   Associated Diagnoses:   Type 2 diabetes, HbA1c goal < 7%; Obese; Dyslipidemia   Author:   Alisia Nascimento      Visit Information   Visit type:  Diabetes Self Management Education .    Referral source:  Gaurang Correa MD.       Chief Complaint   DM Type II, Obesity, Dyslipidemia       History of Present Illness   Pt is here today for initial DM education.  Pt did call RD with questions a couple weeks ago and was given basic guidelines at that time   Discussed nutrition, diabetes, and lifestyle management with pt  Nutrition:  Prior to dx pt states she had been eating anything and everything.  A bagel q am with butter, some sweets but does not drink any SSB  Physical Activity:  since dx started walking 30 min daily   Stress:   mod/ high  with recent heart issues   Sleep: good  Support: adult daughters,   BG Testing: education today BG in office 112mg/dL prior to eating   DM related medication: metformin 500mg ER - only able to tolerate ii tabs daily and has tried higher dose but develops GI upset   Routine diabetes care:  will discuss during follow up consults    Dilated Retinal Eye Exam:    Skin:   Dental:   Feet:   Immunizations:  Hgb A1c: 7.9% = 180 mg/dL estimated average glucose      Health Status   Allergies:    Allergic Reactions (Selected)  Severity Not Documented  Adhesive tape (No reactions were documented)  Codeine (Nausea.)   Medications:  (Selected)   Prescriptions  Prescribed  amlodipine-valsartan 5 mg-160 mg oral tablet: 1 tab(s), Oral, daily, Instructions: office visit before next refill please, # 90 tab(s), 3 Refill(s), HUEY, Type: Maintenance, Pharmacy: [facility] [id number], 1 tab(s) Oral daily,Instr:office visit before next refill please  contour next test strips: contour next test strips, See Instructions, Instructions: testing 2x/ day,  Supply, # 100 EA, 2 Refill(s), Type: Maintenance, Pharmacy: [facility] [id number], testing 2x/ day  metFORMIN 500 mg oral tablet, extended release: = 4 tab(s) ( 2,000 mg ), PO, Daily, Instructions: One daily with supper. Increase by one tablet each week to four tabs daily, # 360 tab(s), 3 Refill(s), Type: Maintenance, Pharmacy: [facility] [id number], 4 tab(s) Oral daily,Instr:One daily with...  microlet lancets: microlet lancets, See Instructions, Instructions: testing 2x/ day, Supply, # 1 box(es), 3 Refill(s), Type: Maintenance, Pharmacy: [facility] [id number], testing 2x/ day  Documented Medications  Documented  Aspir 81: ( 81 mg ), po, daily, 0 Refill(s), Type: Maintenance  Multivitamin: Multivitamin, Supply, 0 Refill(s), Type: Maintenance  Vitamin D3 2000 intl units oral tablet: 1 tab(s) ( 2,000 International Unit ), po, daily, 0 Refill(s), Type: Maintenance  ZyrTEC: ( 10 mg ), Oral, daily, 0 Refill(s), Type: Maintenance  calcium carbonate: ( 600 mg ), Oral, bid, 0 Refill(s), Type: Maintenance  non-medicated sleep aid: non-medicated sleep aid, 1 tab, Oral, hs, Supply, 0 Refill(s), Type: Maintenance,    Medications          *denotes recorded medication          *Multivitamin: Multivitamin, Supply, 0 Refill(s), Type: Maintenance.          *non-medicated sleep aid: 1 tab, Oral, hs, 0 Refill(s).          contour next test strips: See Instructions, testing 2x/ day, 100 EA, 2 Refill(s).          microlet lancets: See Instructions, testing 2x/ day, 1 box(es), 3 Refill(s).          amlodipine-valsartan 5 mg-160 mg oral tablet: 1 tab(s), Oral, daily, office visit before next refill please, 90 tab(s), 3 Refill(s).          *Aspir 81: 81 mg, po, daily, 0 Refill(s).          *calcium carbonate: 600 mg, Oral, bid, 0 Refill(s).          *ZyrTEC: 10 mg, Oral, daily, 0 Refill(s).          *Vitamin D3 2000 intl units oral tablet: 2,000 International Unit, 1 tab(s), po, daily.          metFORMIN 500 mg oral tablet,  extended release: 2,000 mg, 4 tab(s), PO, Daily, One daily with supper. Increase by one tablet each week to four tabs daily, 360 tab(s), 3 Refill(s).     Problem list:    All Problems (Selected)  Acute low back pain / SNOMED CT 039495920 / Confirmed  Allergic rhinitis / SNOMED CT 765098323 / Confirmed  Spotting between menses / SNOMED CT 923996772 / Confirmed  Closed fracture of left distal radius and ulna / SNOMED CT 89887996 / Confirmed  Dyslipidemia / SNOMED CT 9477180273 / Confirmed  White coat syndrome with hypertension / SNOMED CT 2420459390 / Confirmed  Breast cancer / SNOMED CT 770040688 / Confirmed  Obese / SNOMED CT 6176440363 / Probable  Depression, recurrent / SNOMED CT 699608712 / Confirmed  Type 2 diabetes, HbA1c goal < 7% / SNOMED CT 085376880 / Confirmed      Histories   Past Medical History:    Active  Closed fracture of left distal radius and ulna (07075224): Onset on 2012 at 51 years.  Allergic rhinitis (413990541)  Obese (3169748114)  Depression, recurrent (419510772)  Breast cancer (788887502)  White coat syndrome with hypertension (0106025861)  Spotting between menses (703329639)  Acute low back pain (280219491)  Resolved  Pregnancy (529346299):  Resolved on 1981 at 21 years.  Pregnancy (188159579):  Resolved on 1984 at 23 years.  Pregnancy (628821123):  Resolved on 1987 at 27 years.   Family History:    Diabetes mellitus  Sister  [place] - Cancer of colon  Father ()  Colon polyps.  Aunt     Procedure history:    Colonoscopy (SNOMED CT 494355571) performed by Aguilar Becerril on 10/18/2013 at 53 Years.  Comments:  10/30/2013 7:40 AM [facility] - Zia Eng MA  Normal colonoscopy, repeat in 5 years due to family hx colon cancer  Right and left skin-sparing mastectomy performed by Kiesha Phillips MD on 2012 at 51 Years.  [place] lymph node biopsy, right and left axilla performed by Kiesha Phillips MD on 2012 at 51 Years.  Sphincteroplasty/perineoplasty on 2002 at 41  Years.  Comments:  2012 12:00 PM CDT - Aziza Espinal  [facility]/Chevy Muñoz MD   - Spontaneous vaginal delivery (SNOMED CT 2267497242).  Comments:  5/10/2011 10:04 AM CDT - Bella Moore CMA  x 3 (,  and )  Left wrist surgery .   Social History:        Alcohol Assessment            Current, 1-2 times per month, 1 drinks/episode average.      Tobacco Assessment            Never      Substance Abuse Assessment            Never      Employment and Education Assessment            Employed, Work/School description: Home .      Home and Environment Assessment            Marital status: .  Spouse/Partner name: Bulmaro.  Ale children.      Nutrition and Health Assessment            Type of diet: Regular.      Exercise and Physical Activity Assessment            Exercise frequency: 3-4 times/week.  Exercise type: Walking.      Sexual Assessment            Sexually active: No.  Identifies as female, Sexual orientation: Straight or heterosexual.      Physical Examination   Vital Signs   2019 4:31 PM CDT Systolic Blood Pressure 140 mmHg  HI    Diastolic Blood Pressure 79 mmHg    Mean Arterial Pressure 99 mmHg    Vital Signs Comments 2nd BP reading      Measurements from flowsheet : Measurements   2019 4:31 PM CDT Height Measured - Standard 60 in    Weight Measured - Standard 166.6 lb    BSA 1.79 m2    Body Mass Index 32.53 kg/m2  HI         Health Maintenance      Recommendations     Pending (in the next year)        OverDue           Breast Cancer Screen due  05/15/14  and every 2  year(s)           Cervical Cancer Screen (if sexually active) due  16  and every 3  year(s)           Colorectal Cancer Screen (Colonoscopy) (Female) due  10/18/18  and every 5  year(s)        Due            DM - Communication with Managing Provider due  19  and every 1  year(s)           DM - Eye Exam due  19  and every 1  year(s)           DM - Foot Exam due  19  and every  1  year(s)           DM - Microalbumin due  05/30/19  and every 1  year(s)           Hepatitis C Screen 5747-0097 (Female) due  05/30/19  One-time only           Lung Cancer Screen (Female) due  05/30/19  and every 1  year(s)        Near Due            DM - HgbA1c near due  06/28/19  and every 3  month(s)        Due In Future            Influenza Vaccine not due until  09/01/19  and every 1  year(s)           Tobacco Use Screen (Female) not due until  10/24/19  and every 1  year(s)           Alcohol Misuse Screen (Female) not due until  10/25/19  and every 1  year(s)           Depression Screen (Female) not due until  10/25/19  and every 1  year(s)           Lipid Disorders Screen (Female) not due until  03/28/20  and every 1  year(s)           Type 2 Diabetes Mellitus Screen (Female) not due until  03/28/20  and every 1  year(s)     Satisfied (in the past 1 year)        Satisfied            Alcohol Misuse Screen (Female) on  10/25/18.           Body Mass Index Check (Female) on  05/30/19.           Body Mass Index Check (Female) on  04/18/19.           Body Mass Index Check (Female) on  03/28/19.           Body Mass Index Check (Female) on  10/24/18.           DM - HgbA1c on  03/28/19.           Depression Screen (Female) on  10/25/18.           Depression Screen (Female) on  10/25/18.           Depression Screen (Female) on  10/25/18.           High Blood Pressure Screen (Female) on  05/30/19.           High Blood Pressure Screen (Female) on  04/18/19.           High Blood Pressure Screen (Female) on  03/28/19.           High Blood Pressure Screen (Female) on  10/24/18.           Lipid Disorders Screen (Female) on  03/28/19.           Lipid Disorders Screen (Female) on  03/28/19.           Lipid Disorders Screen (Female) on  03/28/19.           Lipid Disorders Screen (Female) on  03/28/19.           Obesity Screen and Counseling (Female) on  05/30/19.           Obesity Screen and Counseling (Female) on   04/18/19.           Obesity Screen and Counseling (Female) on  03/28/19.           Obesity Screen and Counseling (Female) on  10/24/18.           Tobacco Use Screen (Female) on  10/24/18.           Type 2 Diabetes Mellitus Screen (Female) on  03/28/19.        Review / Management   Results review:  Lab results   3/28/2019 6:27 PM CDT Sodium Level 138 mmol/L    Potassium Level 3.9 mmol/L    Chloride Level 102 mmol/L    CO2 Level 27 mmol/L    Glucose Level 111 mg/dL  HI    BUN 11 mg/dL    Creatinine 0.61 mg/dL    BUN/Creat Ratio NOT APPLICABLE    eGFR 99 mL/min/1.73m2    eGFR African American 115 mL/min/1.73m2    Calcium Level 9.7 mg/dL    Hgb A1c 7.9  HI    Cholesterol 264 mg/dL  HI    Non-  HI    HDL 52 mg/dL    Chol/HDL Ratio 5.1  HI      HI    Triglyceride 176 mg/dL  HI   .       Impression and Plan   Diagnosis     Type 2 diabetes, HbA1c goal < 7% (XYR58-RH E11.9).     Obese (HQW62-RP E66.9).     Dyslipidemia (NWN82-SF E78.5).       Counseled: Patient, FARHAD Self Care Behaviors   Today the patient was instructed on the basic physiology of diabetes mellitus, BG testing, and lifestyle guidelines.  Healthy Eating - Education on what foods are primary sources of carbohydrates and how intake affects BG readings, edu on carbohydrate counting, reading food labels, appropriate portion sizes, well balanced meals, diabetic plate method as a general rule.  Patient is provided with numerous ideas to incorporate dietary recommendations, meal planning and preparation, mindful eating techniques and weight loss tips.    Being Active - Education on how exercise helps with :    - lowering BG by increasing the muscles ability to take up and use glucose   - weight loss   - healthier heart (improve lipid profile)   - improve sleep, mood, energy   - decrease stress      Monitoring - Today the patient is instructed on home blood glucose monitoring.  Pt is provided with a meter.  Pt is instructed on the proper use of the  meter, recommended testing schedule and blood glucose target levels.  The patient is able to return appropriate demonstration of the use of the meter.  Pt is instructed on proper disposal of lancets.    Taking Medication -  on Metformin - education on the mechanism of action.    Problem Solving - medical support team assistance, resources provided (written and apps, internet); good control of stress  Healthy Coping - support of friends, family, and medical support team   Reducing Risks - Will discuss at f/u apts.  Weight loss goal of 7 - 10% of current body weight pounds as a reasonable goal to help increase insulin sensitivity   .      Goals:   1.  Practice healthy stress management and get good quality sleep with the goal of 7-8 hours per night.    2.   Physical activity: Recommend increasing to 2 hrs and 30 min a week (150 minutes) of moderate-intensity, or 1 hr and 15 min (75 minutes) a week of vigorous-intensity aerobic physical activity, or an equivalent combination of moderate- and vigorous-intensity aerobic physical activity.  Aerobic activity should be performed in episodes of at least 10 minutes, preferably spread throughout the week.  Muscle-strengthening activities on 2 or more days per week.    3.  Eat in a healthy way, per diabetic plate method.  Nutrition reference: Eat 3 meals a day; snacks are optional.  A meal is three or more food groups; make it colorful for better nutrition.    4.  Total Carbohydrate intake 30 grams for meals, snacks ~ 15 grams  5.  Pt to monitor BG BID.  BG goals: Fasting and before meals 80 - 120 mg/dL, 2 hrs after the start of a meal 80 - 140 mg/dL.    6.  Goal weight 150 - 155# by 12/2019    7.  Read handouts provided.  F/u in ~6 weeks       Professional Services   Time spent with pt 60 min   cc Dr. FAREED Correa

## 2022-02-16 NOTE — NURSING NOTE
Depression Screening Entered On:  8/11/2020 4:38 PM CDT    Performed On:  8/11/2020 4:38 PM CDT by Bella Moore CMA               Depression Screening   Little Interest - Pleasure in Activities :   Not at all   Feeling Down, Depressed, Hopeless :   Not at all   Initial Depression Screen Score :   0 Score   Poor Appetite or Overeating :   Not at all   Trouble Falling or Staying Asleep :   Not at all   Feeling Tired or Little Energy :   Not at all   Feeling Bad About Yourself :   Not at all   Trouble Concentrating :   Not at all   Moving or Speaking Slowly :   Not at all   Thoughts Better Off Dead or Hurting Self :   Not at all   AISHWARYA Difficulty with Work, Home, Others :   Not difficult at all   Detailed Depression Screen Score :   0    Total Depression Screen Score :   0    Bella Moore CMA - 8/11/2020 4:38 PM CDT

## 2022-02-16 NOTE — PROGRESS NOTES
Patient:   KENDAL HILARIO            MRN: [id number]            FIN: [id number]               Age:   58 years     Sex:  Female     :  1960   Associated Diagnoses:   Postmenopausal bleeding   Author:   Aguilar Smith MD      Visit Information      Date of Service: 10/24/2018 08:42 am  Performing Location: [facility]  Encounter#: [id number]      Primary Care Provider (PCP):  Marcelle Nash MD    NPI# [id number]      Referring Provider:  Aguilar Smith MD    NPI# [id number]      Chief Complaint   10/24/2018 8:50 AM CDT   Annual exam. Concerns with spotting again.      Interval History   The patient is in today for followup of postmenopausal bleeding.  She stopped her tamoxifen about a year ago and did not start any other new similar medications.  She has completed her breast cancer treatment with the tamoxifen after having bilateral mastectomy following lumpectomy.  She has spotted a bit again even though she is off the tamoxifen; although, none of the bleeding is very heavy and nothing as heavy as she had in the past.  She is status post endometrial biopsy and pelvic ultrasound on a couple of occasions the most recent by myself.  The endometrial biopsy found some polypoid material but nothing neoplastic.  The patient has not had any discomfort with any of the bleeding.  She has a history of obstetric tear which broke down and had a repair disfiguring her pelvic floor but otherwise she has fairly good stool continence and does not feel that the procedure was unsuccessful.        Review of Systems   Review  of systems is negative except as documented under interval history.      Health Status   Allergies:    Allergic Reactions (Selected)  Severity Not Documented  Adhesive tape (No reactions were documented)  Codeine (Nausea.)   Medications:  (Selected)   Prescriptions  Prescribed  amlodipine-valsartan 5 mg-160 mg oral tablet: 1 tab(s), po, daily, # 90 tab(s), 3 Refill(s), Type: Maintenance, Pharmacy:  [id number], 1 tab(s) po daily  Documented Medications  Documented  Aspir 81: ( 81 mg ), po, daily, 0 Refill(s), Type: Maintenance  Multivitamin: Multivitamin, Supply, 0 Refill(s), Type: Maintenance  Vitamin D3 2000 intl units oral tablet: 1 tab(s) ( 2,000 International Unit ), po, daily, 0 Refill(s), Type: Maintenance  ZyrTEC: ( 10 mg ), Oral, daily, 0 Refill(s), Type: Maintenance  amoxicillin: Oral, tid, Instructions: for abscess tooth 10/24/18, 0 Refill(s), Type: Maintenance  calcium carbonate: ( 600 mg ), Oral, bid, 0 Refill(s), Type: Maintenance  non-medicated sleep aid: non-medicated sleep aid, 1 tab, Oral, hs, Supply, 0 Refill(s), Type: Maintenance   Problem list:    All Problems  Allergic rhinitis / SNOMED CT 277340230 / Confirmed  Obese / SNOMED CT 7280238091 / Probable  Closed fracture of left distal radius and ulna / SNOMED CT 43516465 / Confirmed  Depression, recurrent / SNOMED CT 289125874 / Confirmed  Breast cancer / SNOMED CT 787465843 / Confirmed  HTN (hypertension) / SNOMED CT 9002050544 / Confirmed  Spotting between menses / SNOMED CT 601563769 / Confirmed  Acute low back pain / SNOMED CT 339217246 / Confirmed      Histories   Past Medical History:    Active  Closed fracture of left distal radius and ulna (86685492): Onset on 2012 at 51 years.  Allergic rhinitis (131102378)  Obese (0847812920)  Depression, recurrent (793109006)  Breast cancer (897522360)  HTN (hypertension) (2539012141)  Spotting between menses (455669156)  Acute low back pain (922816261)   Family History:    Diabetes mellitus  Sister  [place] - Cancer of colon  Father ()  Colon polyps.  Aunt     Procedure history:    Colonoscopy (556809985) on 10/18/2013 at 53 Years.  Comments:  10/30/2013 7:40 AM - Zia Eng MA  Normal colonoscopy, repeat in 5 years due to family hx colon cancer  Right and left skin-sparing mastectomy on 2012 at 51 Years.  [place] lymph node biopsy, right and left axilla on 2012 at  51 Years.  Sphincteroplasty/perineoplasty on 2002 at 41 Years.  Comments:  2012 12:00 PM - Aziza Espinal  [facility]/Chevy Muñoz MD   - Spontaneous vaginal delivery (9637612135).  Comments:  5/10/2011 10:04 AM - Teresa PICKENS, Bella  x 3 (,  and )  Left wrist surgery .   Social History:        Alcohol Assessment            Current, 1-2 times per month, 1 drinks/episode average.      Tobacco Assessment            Never      Substance Abuse Assessment            Never      Employment and Education Assessment            Employed, Work/School description: [employer].      Home and Environment Assessment            Marital status: .  Spouse/Partner name: Bulmaro.  3 children.      Exercise and Physical Activity Assessment            Exercise frequency: 3-4 times/week.  Exercise type: Walking.      Sexual Assessment            Sexually active: Yes.  Sexual orientation: Heterosexual.        Physical Examination   Vital Signs   10/24/2018 8:50 AM CDT Temperature Tympanic 98.0 DegF    Peripheral Pulse Rate 84 bpm    Pulse Site Radial artery    HR Method Manual    Systolic Blood Pressure 182 mmHg  HI    Diastolic Blood Pressure 84 mmHg  HI    Mean Arterial Pressure 117 mmHg    BP Site Right arm    BP Method Manual    Vital Signs Comments Pt takes home bps which are normal.      Gynecologic:  The vulva is normal with the exception of posterior pelvic relaxation.  There is a scar going down on the left side towards the anus and a [place] about 0.5 cm across of raised epithelium on the right side on what would be the perineal body but there is virtually no muscle in that layer between the vagina and the rectum to call it a perineal body.  The vagina seems healthy and cervix is normal.  Pap smear is taken with slight bleeding from the swab..       Review / Management   Radiology results   Ultrasound, Vaginal probe pelvic ultrasound is performed.  The uterus is fairly normal in size  at about 6 x 4 cm.  Endometrium is somewhat thickened; although, not high density but slightly higher density echoes than the myometrium.  This measures 12 mm across.  The myometrium appears normal.  The ovaries are normal bilaterally.        Impression and Plan   Diagnosis     Postmenopausal bleeding (KKA31-HC N95.0).     Vaginal bleeding with wiping, probably related to her incomplete healing and normalization of the 4th degree laceration and repairs.  The lesions are not noted absolutely other than as described.  The thickened endometrium is probably a tamoxifen effect with no change in the last couple of years and could represent polyp..     Plan:  The patient was reassured.  She will report any heavier bleeding.  Otherwise she does have a colonoscopy scheduled next week to rule out any lesions in the rectum that could be giving her some bleeding. .    Patient Instructions:       Counseled: Patient, Regarding diagnosis, Regarding treatment, Regarding medications, Verbalized understanding.

## 2022-02-16 NOTE — LETTER
(Inserted Image. Unable to display)   August 11, 2021  KENDAL HILARIO  [address] [place] [address]          Dear KENDAL,      Thank you for selecting [facility] for your healthcare needs.    Our records indicate you are due for the following services:    Diabetic Exam ~ Please bring your glucose meter and/or your blood glucose diary to your appointment.  Urine labs ~ Please be prepared to leave a urine specimen for evaluation  Fasting Lab Tests ~ Please do not eat or drink anything 10 hours prior to your scheduled appointment time.  (Water and any medications that you may need are allowed unless directed otherwise.)    If you had your labs done at another facility or with [facility] at [facility] access hospital, please bring in a copy of the results to your next visit, mail a copy, or drop off a copy of your results to your Healthcare Provider.    You are due for lab work and an office visit; please schedule the lab appointment 1 week before the office visit.  This will assure all results are available to discuss with your Healthcare Provider during your visit.    (FYI   Regarding office visits: In some instances, a video visit or telephone visit may be offered as an option.)      **It is very helpful if you bring your medication bottles to your appointment.  This assures we have all of your current medications, including strength and dosing information, documented accurately in your medical record.    To schedule an appointment or if you have further questions, please contact your clinic at [phone].         Powered by [email or web address]    Sincerely,    Gaurang Correa M.D.

## 2022-02-16 NOTE — PROGRESS NOTES
Patient:   KENDAL HILARIO            MRN: [id number]            FIN: [id number]               Age:   61 years     Sex:  Female     :  1960   Associated Diagnoses:   Hyperlipidemia; White coat syndrome with hypertension; Type II diabetes mellitus   Author:   Gaurang Correa MD      Visit Information      Date of Service: 2021 04:49 pm  Performing Location: [facility]  Encounter#: [id number]      Primary Care Provider (PCP):  Gaurang Correa MD    NPI# [id number]      Referring Provider:  Gaurang Correa MD, NPI# [id number]      Chief Complaint       2021 5:15 PM CDT home readings   2021 5:11 PM CDT f/u chronic               Additional Information:No additional information recorded during visit.   Chief complaint and symptoms as noted above and confirmed with patient.  Recent lab and diagnostic studies reviewed with patient      History of Present Illness   2019: Kendal returns for follow-up.  Diagnosed with type 2 diabetes earlier this year after returning from a cruise.  A1c at that time was 7.9%.  Introduced to metformin extended release and currently taking at thousand milligrams daily.  She has try to work on lifestyle improvements including reduction of carbohydrates and increasing physical activity.  A1c has improved considerably with these changes.  Questioning whether she still needs to remain on metformin.  Also significant family history of colorectal cancer.  Overdue for 5-year follow-up colonoscopy.  She had a generally a poor experience with colonoscopy back in  related to ineffective sedation with Versed.  Inquiring whether there would be any other alternative anesthesia options.    2021:  Kendal presents clinic for follow-up.  Overall doing well.  Expressed some anxieties about her labs though was reassured to hear that her low urine creatinine has no clinical relevance.  Stable glycemic control though is checking her blood sugar 4 times per day.  Feels well.  No  specific concerns or complaints.  Did have colonoscopy done this past fall which was uncomplicated.         Review of Systems   Constitutional:  No fever, No chills.    Eye:  Negative except as documented in history of present illness.    Ear/Nose/Mouth/Throat:  Negative except as documented in history of present illness.    Respiratory:  No shortness of breath.    Cardiovascular:  No chest pain, No palpitations, No peripheral edema, No syncope.    Gastrointestinal:  No nausea, No vomiting, No abdominal pain.    Genitourinary:  No dysuria, No hematuria.    Hematology/Lymphatics:  Negative except as documented in history of present illness.    Endocrine:  No excessive thirst, No polyuria.    Immunologic:  No recurrent fevers.    Musculoskeletal:  No joint pain, No muscle pain.    Neurologic:  Alert and oriented X4, No numbness, No tingling, No headache.       Health Status   Allergies:    Allergic Reactions (Selected)  Severity Not Documented  Adhesive tape (No reactions were documented)  Codeine (Nausea.)   Medications:  (Selected)   Prescriptions  Prescribed  MetFORMIN (Eqv-Glucophage XR) 500 mg oral tablet, extended release: = 4 tab(s) ( 2,000 mg ), Oral, daily, # 120 tab(s), 0 Refill(s), Type: Maintenance, Pharmacy: [facility] #[id number], okay x 1month due for a visit next month, 4 tab(s) Oral daily,x30 day(s), 60, in, 08/11/20 13:39:00 CDT, Height Measured, 160, l...  amlodipine-valsartan 5 mg-160 mg oral tablet: 1 tab(s), Oral, daily, # 90 tab(s), 1 Refill(s), HUEY, Type: Maintenance, Pharmacy: [facility] #[id number], due for appt next month, 1 tab(s) Oral daily, 60, in, 09/23/21 17:15:00 CDT, Height Measured, 155.2, lb, 09/23/21 17:11:00 CDT, Weight Cherelle...  atorvastatin 20 mg oral tablet: = 1 tab(s) ( 20 mg ), Oral, daily, # 90 tab(s), 1 Refill(s), Type: Maintenance, Pharmacy: [facility] #[id number], 1 tab(s) Oral daily, 60, in, 09/23/21 17:15:00 CDT, Height Measured, 155.2, lb, 09/23/21  17:11:00 CDT, Weight Measured  contour next test strips: contour next test strips, See Instructions, Instructions: testing 2x/ day, Supply, # 100 EA, 2 Refill(s), Type: Maintenance, Pharmacy: [id number], testing 2x/ day  microlet lancets: microlet lancets, See Instructions, Instructions: testing 2x/ day, Supply, # 1 box(es), 3 Refill(s), Type: Maintenance, Pharmacy: [id number], testing 2x/ day  Documented Medications  Documented  Aspir 81: ( 81 mg ), po, daily, 0 Refill(s), Type: Maintenance  Cinnamon: ( 1,000 mg ), Oral, bid, 0 Refill(s), Type: Maintenance  Multivitamin: Multivitamin, Supply, 0 Refill(s), Type: Maintenance  Vitamin D3 2000 intl units oral tablet: = 1 tab(s) ( 2,000 International Unit ), po, bid, 0 Refill(s), Type: Maintenance  Vitamin D3 5000 intl units oral capsule: = 1 cap(s) ( 125 mcg ), Oral, daily, 0 Refill(s), Type: Maintenance  calcium carbonate: ( 600 mg ), Oral, bid, Instructions: w/ vitamin D4 800IU, 0 Refill(s), Type: Maintenance  loratadine 10 mg oral tablet: = 1 tab(s) ( 10 mg ), Oral, daily, 0 Refill(s), Type: Maintenance  non-medicated sleep aid: non-medicated sleep aid, 1 tab, Oral, hs, Supply, 0 Refill(s), Type: Maintenance,    Medications          *denotes recorded medication          *Multivitamin: Multivitamin, Supply, 0 Refill(s), Type: Maintenance.          *non-medicated sleep aid: 1 tab, Oral, hs, 0 Refill(s).          contour next test strips: See Instructions, testing 2x/ day, 100 EA, 2 Refill(s).          microlet lancets: See Instructions, testing 2x/ day, 1 box(es), 3 Refill(s).          amlodipine-valsartan 5 mg-160 mg oral tablet: 1 tab(s), Oral, daily, 90 tab(s), 1 Refill(s).          *Aspir 81: 81 mg, po, daily, 0 Refill(s).          atorvastatin 20 mg oral tablet: 20 mg, 1 tab(s), Oral, daily, 90 tab(s), 1 Refill(s).          *calcium carbonate: 600 mg, Oral, bid, w/ vitamin D4 800IU, 0 Refill(s).          *Vitamin D3 2000 intl units  oral tablet: 2,000 International Unit, 1 tab(s), po, bid, 0 Refill(s).          *Vitamin D3 5000 intl units oral capsule: 125 mcg, 1 cap(s), Oral, daily, 0 Refill(s).          *Cinnamon: 1,000 mg, Oral, bid, 0 Refill(s).          *loratadine 10 mg oral tablet: 10 mg, 1 tab(s), Oral, daily, 0 Refill(s).          MetFORMIN (Eqv-Glucophage XR) 500 mg oral tablet, extended release: 2,000 mg, 4 tab(s), Oral, daily, for 30 day(s), 120 tab(s), 0 Refill(s).       Problem list:    All Problems  Acute low back pain / SNOMED CT 600653979 / Confirmed  Allergic rhinitis / SNOMED CT 823169709 / Confirmed  Spotting between menses / SNOMED CT 564264020 / Confirmed  Closed fracture of left distal radius and ulna / SNOMED CT 65797734 / Confirmed  Dyslipidemia / SNOMED CT 0746649615 / Confirmed  White coat syndrome with hypertension / SNOMED CT 5774768433 / Confirmed  Breast cancer / SNOMED CT 391251411 / Confirmed  Obese / SNOMED CT 4311862500 / Probable  Depression, recurrent / SNOMED CT 496494749 / Confirmed  Type 2 diabetes, HbA1c goal < 7% / SNOMED CT 326930952 / Confirmed  Resolved: Pregnancy / SNOMED CT 831161307  Resolved: Pregnancy / SNOMED CT 223404521  Resolved: Pregnancy / SNOMED CT 619922858      Histories   Past Medical History:    Active  Closed fracture of left distal radius and ulna (46747293): Onset on 2012 at 51 years.  Allergic rhinitis (112759077)  Obese (1020869377)  Depression, recurrent (049872949)  Breast cancer (889192799)  White coat syndrome with hypertension (6401986188)  Spotting between menses (742699055)  Acute low back pain (408742191)  Resolved  Pregnancy (090043464):  Resolved on 1981 at 21 years.  Pregnancy (977195968):  Resolved on 1984 at 23 years.  Pregnancy (203249995):  Resolved on 1987 at 27 years.   Family History:    Diabetes mellitus  Sister  CA - Cancer of colon  Father ()  Colon polyps.  Aunt     Procedure history:    Colonoscopy (883209920) on 2020 at 60  Years.  Comments:  2021 9:55 AM CDT - Teresa Bella PICKENS  1 small hyperplastic polyp.  Per jail this does not increrase her risk for colon Ca.  Prior colonoscopy in 2013 that also did not have any polyps.  She should have a f/u exam in 10yrs  Colonoscopy (178114099) on 10/18/2013 at 53 Years.  Comments:  10/30/2013 7:40 AM CDT - Zia Eng MA  Normal colonoscopy, repeat in 5 years due to family hx colon cancer  Right and left skin-sparing mastectomy on 2012 at 51 Years.  [place] lymph node biopsy, right and left axilla on 2012 at 51 Years.  Sphincteroplasty/perineoplasty on 2002 at 41 Years.  Comments:  2012 12:00 PM CDT - Aziza Espinal  [facility]/Chevy Muñoz MD   - Spontaneous vaginal delivery (3923156317).  Comments:  5/10/2011 10:04 AM CDT - Teresa Bella PICKENS  x 3 (,  and )  Left wrist surgery .   Social History:        Electronic Cigarette/Vaping Assessment            Electronic Cigarette Use: Never.      Alcohol Assessment            Current, 1-2 times per month, 1 drinks/episode average.      Tobacco Assessment            Never            Never (less than 100 in lifetime)      Substance Abuse Assessment            Never      Employment and Education Assessment            Employed, Work/School description: Home .      Home and Environment Assessment            Marital status: .  Spouse/Partner name: Bulmaro.  Ale children.      Nutrition and Health Assessment            Type of diet: Regular.      Exercise and Physical Activity Assessment            Exercise frequency: 3-4 times/week.  Exercise type: Walking.      Sexual Assessment            Sexually active: No.  Identifies as female, Sexual orientation: Straight or heterosexual.        Physical Examination   vital signs stable, as noted above   Vital Signs   2021 5:15 PM CDT Systolic Blood Pressure 118 mmHg    Diastolic Blood Pressure 78 mmHg    Mean Arterial Pressure 91 mmHg    BP Method  Electronic   9/23/2021 5:11 PM CDT Temperature Tympanic 98.6 DegF    Peripheral Pulse Rate 118 bpm  HI    Systolic Blood Pressure 128 mmHg    Diastolic Blood Pressure 84 mmHg  HI    Mean Arterial Pressure 99 mmHg    Oxygen Saturation 97 %      Measurements from flowsheet : Measurements   9/23/2021 5:15 PM CDT Height Measured - Standard 60 in   9/23/2021 5:11 PM CDT Height Measured - Standard 60 in    Weight Measured - Standard 155.2 lb    BSA 1.72 m2    Body Mass Index 30.31 kg/m2  HI      General:  Alert and oriented, No acute distress.    Eye:  Extraocular movements are intact.    HENT:  Normocephalic, Oral mucosa is moist.    Neck:  Supple, No carotid bruit, No jugular venous distention, No lymphadenopathy.    Respiratory:  Lungs are clear to auscultation, Respirations are non-labored.    Cardiovascular:  Normal rate, No murmur, No edema.    Gastrointestinal:  Soft, Non-tender, Non-distended, Normal bowel sounds.    Musculoskeletal:  Normal range of motion, Normal strength, No tenderness.    Feet:  Normal by visual exam, Normal pulses.    Neurologic:  Alert, Oriented, Normal motor function, No focal deficits.    Cognition and Speech:  Oriented, Speech clear and coherent.    Psychiatric:  Appropriate mood & affect.       Review / Management   Results review:  Lab results   9/18/2021 8:19 AM CDT Sodium Level 140 mmol/L    Potassium Level 4.4 mmol/L    Chloride Level 104 mmol/L    CO2 Level 29 mmol/L    Glucose Level 105 mg/dL  HI    BUN 11 mg/dL    Creatinine 0.77 mg/dL    BUN/Creat Ratio NOT APPLICABLE    eGFR 83 mL/min/1.73m2    eGFR African American 96 mL/min/1.73m2    Calcium Level 9.8 mg/dL    Hgb A1c 6.1  HI    Cholesterol 141 mg/dL    Non-HDL 96    HDL 45 mg/dL  LOW    Chol/HDL Ratio 3.1    LDL 74    Triglyceride 137 mg/dL    U Creatinine 16 mg/dL  LOW    U Microalbumin <0.2 mg/dL    Ur Microalb/Creat Ratio NOTE   .       Impression and Plan   Diagnosis     Hyperlipidemia (SHI25-YT E78.5).     White coat  syndrome with hypertension (OQD09-YI I10).     Type II diabetes mellitus (YLJ83-GM E11.9).       .) type II DM, controlled  hypoglycemic medications: metformin ER 1000mg daily  insulin therapy:  last HbA1c: 6.1%   microvascular complications: none known  macrovascular complications: none known  ASA/KAVYA/statin:  ASA 81mg daily, atorvastatin 20mg qhs    .) hypertension, controlled  current antihypertensive regimen: amlodipine/valsartan 5/160mg daily   regimen changes:  intolerance:  future titration/work-up plan:     .) health maintenance  - colonoscopy 10/2020 (family h/o CRC); reassess at age 66  - h/o bilateral mastectomy  - due for Shingrix  - last PAP 10/2018  - completed COVID vaccination    RTC in 12 months

## 2022-02-16 NOTE — NURSING NOTE
Hearing and Vision Screening Entered On:  9/23/2021 5:17 PM CDT    Performed On:  9/23/2021 5:17 PM CDT by Bella Moore CMA               Hearing and Vision Screening   Audiogram Result Right Ear :   Pass   Audiogram Result Left Ear :   Pass   Bella Moore CMA - 9/23/2021 5:17 PM CDT

## 2022-02-16 NOTE — PROGRESS NOTES
Chief Complaint    follow up labs  History of Present Illness      Patient with history of hypertension, prediabetes, dyslipidemia, and obesity here for review of lab work which is diagnostic of type 2 diabetes.  We reviewed the rationale and methods for treatment of diabetes to prevent complications.  Reviewed the importance of weight loss carbohydrate restricted diet and medication.  She feels well.  No polyuria or polydipsia.  Blood pressure readings at home reveal excellent control.  Review of Systems      No headache, chest pain, dyspnea, edema.  Physical Exam   Vitals & Measurements    HR: 97(Peripheral)  BP: 144/83     HT: 60 in  WT: 171 lb  BMI: 33.39       Patient is a healthy-appearing woman in no distress.  Eyes appear normal.  H&T exam unremarkable.  Chest clear.  Heart exam regular.  Extremities have no edema.  Sensation monofilament intact in the feet.  Pulses intact in the feet.  Assessment/Plan       Dyslipidemia (E78.5)         Patient has had good response to weight loss and her lipids and would prefer to work on diet and weight loss for 3 months before starting medication.         Ordered:          02589 office outpatient visit 25 minutes (Charge), Quantity: 1, Type 2 diabetes, HbA1c goal < 7%  Dyslipidemia  Obese  White coat syndrome with hypertension                Obese (E66.9)         Encouraged weight loss.         Ordered:          01536 office outpatient visit 25 minutes (Charge), Quantity: 1, Type 2 diabetes, HbA1c goal < 7%  Dyslipidemia  Obese  White coat syndrome with hypertension                Type 2 diabetes, HbA1c goal < 7% (E11.9)         Metformin and lifestyle changes.  Diabetes education.  Lab work in 4 months.         Ordered:          28890 office outpatient visit 25 minutes (Charge), Quantity: 1, Type 2 diabetes, HbA1c goal < 7%  Dyslipidemia  Obese  White coat syndrome with hypertension          Diabetic Educator Consult (Request), Referred to: Merry Nascimento, Reason:  DM, Type 2 diabetes, HbA1c goal < 7%                White coat syndrome with hypertension (I10)         Controlled by home readings.         Ordered:          02044 office outpatient visit 25 minutes (Charge), Quantity: 1, Type 2 diabetes, HbA1c goal < 7%  Dyslipidemia  Obese  White coat syndrome with hypertension          Return to Clinic (Request), RFV: CSS blood pressure check, Return in 1 week                Orders:         metFORMIN, = 4 tab(s) ( 2,000 mg ), PO, Daily, Instructions: One daily with supper. Increase by one tablet each week to four tabs daily, # 360 tab(s), 3 Refill(s), Type: Maintenance, [id number], 4 tab(s) Oral daily,Instr:One daily with..., (Ordered)         Return to Clinic (Request), RFV: Hgb A1c, lipids, Return in 4 months  Patient Information     Name:KENDAL HILARIO      Address:      [address] [place] [id number]     Sex:Female     YOB: 1960     [phone]     Emergency Contact:CAROLYNN HILARIO     MRN:[id number]     FIN:[id number]     Location:[facility]     Date of Service:04/18/2019      Primary Care Physician:       Marcelle Nash MD, [phone]      Attending Physician:       Yoav Vega MD, [phone]  Problem List/Past Medical History    Ongoing     Acute low back pain     Allergic rhinitis     Breast cancer     Closed fracture of left distal radius and ulna     Depression, recurrent     Dyslipidemia     Obese     Spotting between menses     Type 2 diabetes, HbA1c goal < 7%     White coat syndrome with hypertension    Historical     Pregnancy     Pregnancy     Pregnancy  Procedure/Surgical History     Colonoscopy (10/18/2013)      Comments: Normal colonoscopy, repeat in 5 years due to family hx colon cancer.     Right and left skin-sparing mastectomy (06/04/2012)     [place] lymph node biopsy, right and left axilla (06/04/2012)     Sphincteroplasty/perineoplasty (07/18/2002)       Comments: [facility]/Chevy Muñoz MD.     Left wrist surgery       - Spontaneous vaginal delivery      Comments: x 3 (,  and ).  Medications        Vitamin D3 2000 intl units oral tablet: 2,000 International Unit, 1 tab(s), po, daily.        Multivitamin: Multivitamin, Supply, 0 Refill(s), Type: Maintenance.        Aspir 81: 81 mg, po, daily, 0 Refill(s).        calcium carbonate: 600 mg, Oral, bid, 0 Refill(s).        ZyrTEC: 10 mg, Oral, daily, 0 Refill(s).        non-medicated sleep aid: 1 tab, Oral, hs, 0 Refill(s).        amlodipine-valsartan 5 mg-160 mg oral tablet: 1 tab(s), Oral, daily, office visit before next refill please, 90 tab(s), 3 Refill(s).        metFORMIN 500 mg oral tablet, extended release: 2,000 mg, 4 tab(s), PO, Daily, One daily with supper. Increase by one tablet each week to four tabs daily, 360 tab(s), 3 Refill(s).         Allergies    adhesive tape    codeine (Nausea.)  Social History    Smoking Status - 10/24/2018     Never smoker     Alcohol      Current, 1-2 times per month, 1 drinks/episode average., 05/10/2011     Employment and Education      Employed, Work/School description: Home ., 2018     Exercise and Physical Activity      Exercise frequency: 3-4 times/week. Exercise type: Walking., 2013     Home and Environment      Marital status: . Spouse/Partner name: Bulmaro. 3 children., 2013     Nutrition and Health      Type of diet: Regular., 2018     Sexual      Sexually active: No. Identifies as female, Sexual orientation: Straight or heterosexual., 2018     Substance Abuse      Never, 05/10/2011     Tobacco      Never, 05/10/2011  Family History    CA - Cancer of colon: Father.    Colon polyps.: Aunt.    Diabetes mellitus: Sister.  Immunizations      Vaccine Date Status Comments      tetanus/diphth/pertuss (Tdap) adult/adol 2012 Given Pt gave verbal consent.      influenza 10/23/2003 Recorded      Td 2003  Recorded      tetanus 01/11/2000 Recorded      Td 08/08/1983 Recorded  Lab Results          Lab Results (Last 4 results within 90 days)           Sodium Level: 138 mmol/L [135 mmol/L - 146 mmol/L] (03/28/19 18:27:00)          Potassium Level: 3.9 mmol/L [3.5 mmol/L - 5.3 mmol/L] (03/28/19 18:27:00)          Chloride Level: 102 mmol/L [98 mmol/L - 110 mmol/L] (03/28/19 18:27:00)          CO2 Level: 27 mmol/L [20 mmol/L - 32 mmol/L] (03/28/19 18:27:00)          Glucose Level: 111 mg/dL High [65 mg/dL - 99 mg/dL] (03/28/19 18:27:00)          BUN: 11 mg/dL [7 mg/dL - 25 mg/dL] (03/28/19 18:27:00)          Creatinine Level: 0.61 mg/dL [0.5 mg/dL - 1.05 mg/dL] (03/28/19 18:27:00)          BUN/Creat Ratio: NOT APPLICABLE [6  - 22] (03/28/19 18:27:00)          eGFR: 99 mL/min/1.73m2 (03/28/19 18:27:00)          eGFR African American: 115 mL/min/1.73m2 (03/28/19 18:27:00)          Calcium Level: 9.7 mg/dL [8.6 mg/dL - 10.4 mg/dL] (03/28/19 18:27:00)          Hgb A1c: 7.9 High (03/28/19 18:27:00)          Cholesterol: 264 mg/dL High (03/28/19 18:27:00)          Non-HDL Cholesterol: 212 High (03/28/19 18:27:00)          HDL: 52 mg/dL (03/28/19 18:27:00)          Cholesterol/HDL Ratio: 5.1 High (03/28/19 18:27:00)          LDL: 179 High (03/28/19 18:27:00)          Triglyceride: 176 mg/dL High (03/28/19 18:27:00)

## 2022-02-16 NOTE — PROGRESS NOTES
Patient:   KENDAL HILARIO            MRN: [id number]            FIN: [id number]               Age:   57 years     Sex:  Female     :  1960   Associated Diagnoses:   HTN (Hypertension)   Author:   Marcelle Nash MD      Visit Information      Date of Service: 2018 05:37 pm  Performing Location: [facility]  Encounter#: [id number]      Primary Care Provider (PCP):  Jake Monsalve MD    NPI# [id number]      Referring Provider:  Marcelle Nash MD    NPI# [id number]      Chief Complaint   2018 5:57 PM CST    medication refill        History of Present Illness             The patient presents for evaluation of hypertension.  The hypertension  is characterized by not headache, not vision changes, not fatigue, not nose bleeds, not confusion, not nausea and not vomiting.  The severity of the hypertension symptom(s) is mild.  The timing/course of hypertension symptom(s) is episodic.  The patient has had these symptom(s) of hypertension for an unknown duration of time.  The context of the hypertension symptom(s): occurred reports her blood pressure at home is 119/70s, but it is always high here.  The elevated blood pressure identified technique: via automated technique.  Associated symptoms consist of none.  Additional pertinent history: her bp medication was doubled at her last visit.        Review of Systems   Constitutional:  Negative except as documented in history of present illness.    Eye:  Negative except as documented in history of present illness.    Ear/Nose/Mouth/Throat:  Negative except as documented in history of present illness.    Respiratory:  Negative except as documented in history of present illness.    Cardiovascular:  Negative except as documented in history of present illness.    Gastrointestinal:  Negative except as documented in history of present illness.    Immunologic:  Negative except as documented in history of present illness.    Integumentary:  Negative  except as documented in history of present illness.              Health Status   Allergies:    Allergic Reactions (Selected)  Severity Not Documented  Adhesive tape (No reactions were documented)  Codeine (Nausea.)   Medications:  (Selected)   Prescriptions  Prescribed  amlodipine-valsartan 5 mg-160 mg oral tablet: 1 tab(s), po, daily, # 90 tab(s), 3 Refill(s), Type: Maintenance, Pharmacy: [facility] #[id number], 1 tab(s) po daily  penicillin V potassium 500 mg oral tablet: 1 tab(s) ( 500 mg ), po, tid, # 30 tab(s), 0 Refill(s), Type: Maintenance, Pharmacy: [facility] #[id number], 1 tab(s) po tid,x10 day(s)  Documented Medications  Documented  Aspir 81: ( 81 mg ), po, daily, 0 Refill(s), Type: Maintenance  Multivitamin: Multivitamin, Supply, 0 Refill(s), Type: Maintenance  Vitamin D3 2000 intl units oral tablet: 1 tab(s) ( 2,000 International Unit ), po, daily, 0 Refill(s), Type: Maintenance  calcium carbonate: 0 Refill(s), Type: Maintenance   Problem list:    All Problems  Acute Low Back Pain / ICD-9-.2 / Confirmed  Allergic Rhinitis / ICD-9- / Confirmed  Breast Cancer / ICD-9-.9 / Confirmed  Closed fracture of left distal radius and ulna / ICD-9-.44 / Confirmed  Depression, Recurrent / ICD-9-.30 / Confirmed  HTN (Hypertension) / ICD-9-.9 / Confirmed  Obese / ICD-9-.00 / Probable  Spotting between Menses / ICD-9-.6 / Confirmed      Histories   Past Medical History:    Active  Depression, Recurrent (296.30)   Family History:    Diabetes mellitus  Sister  CA - Cancer of colon  Father ()  Colon polyps.  Aunt     Procedure history:    Colonoscopy ([id number]) on 10/18/2013 at 53 Years.  Comments:  10/30/2013 7:40 AM - Zia Eng MA  Normal colonoscopy, repeat in 5 years due to family hx colon cancer  Right and left skin-sparing mastectomy on 2012 at 51 Years.  [place] lymph node biopsy, right and left axilla on 2012 at 51  Years.  Sphincteroplasty/perineoplasty on 2002 at 41 Years.  Comments:  2012 12:00 PM - Aziza Espinal  [facility]/Chevy Muñoz MD   - Spontaneous vaginal delivery ([id number]).  Comments:  5/10/2011 10:04 AM - Danitahellen CELIO, Bella  x 3 (,  and )  Left wrist surgery .   Social History:        Alcohol Assessment            Current, 1-2 times per month, 1 drinks/episode average.      Tobacco Assessment            Never      Substance Abuse Assessment            Never      Employment and Education Assessment            Employed, Work/School description: [employer].      Home and Environment Assessment            Marital status: .  Spouse/Partner name: Bulmaro.  3 children.      Exercise and Physical Activity Assessment            Exercise frequency: 3-4 times/week.  Exercise type: Walking.      Sexual Assessment            Sexually active: Yes.  Sexual orientation: Heterosexual.        Physical Examination   Vital Signs   2018 5:57 PM CST Temperature Tympanic 98.5 DegF    Peripheral Pulse Rate 94 bpm    Pulse Site Radial artery    HR Method Manual    Systolic Blood Pressure 162 mmHg  HI    Diastolic Blood Pressure 84 mmHg  HI    Mean Arterial Pressure 110 mmHg    BP Site Right arm    BP Method Manual      Measurements from flowsheet : Measurements   2018 5:57 PM CST    Weight Measured - Standard                177 lb     General:  Alert and oriented, No acute distress.    Eye:  Pupils are equal, round and reactive to light, Extraocular movements are intact, Normal conjunctiva.    HENT:  Normocephalic, hearing grossly normal during our conversation.    Respiratory:  Lungs are clear to auscultation, Respirations are non-labored, Breath sounds are equal, Symmetrical chest wall expansion.    Cardiovascular:  Normal rate, Regular rhythm, No murmur, No gallop, Normal peripheral perfusion, brisk capillary refill.    Musculoskeletal:  No deformity, Normal gait.     Integumentary:  Warm, Dry, No rash.    Neurologic:  Alert, Oriented, No focal deficits.    Psychiatric:  Cooperative, Appropriate mood & affect, Normal judgment, Non-suicidal.       Health Maintenance      Recommendations     Pending (in the next year)        OverDue           Influenza Vaccine due  10/23/04  and every 1  year(s)           Type 2 Diabetes Mellitus Screen (Female) due  05/04/13  and every 1  year(s)           Breast Cancer Screen due  05/15/14  and every 2  year(s)           Alcohol Misuse Screen (Female) due  08/05/14  and every 1  year(s)           Cervical Cancer Screen (if sexually active) due  08/05/16  and every 3  year(s)        Due            Depression Screen (Female) due  01/16/18  and every 1  year(s)           Lung Cancer Screen (Female) due  01/16/18  and every 1  year(s)           Lipid Disorders Screen (Female) due  01/16/18  and every 1  year(s)        Due In Future            Body Mass Index Check (Female) not due until  04/03/18  and every 1  year(s)           Colorectal Cancer Screen (Colonoscopy) (Female) not due until  10/18/18  and every 5  year(s)     Satisfied (in the past 1 year)        Satisfied            Body Mass Index Check (Female) on  04/03/17.           Body Mass Index Check (Female) on  01/27/17.           High Blood Pressure Screen (Female) on  01/16/18.           High Blood Pressure Screen (Female) on  10/12/17.           High Blood Pressure Screen (Female) on  09/18/17.           High Blood Pressure Screen (Female) on  04/03/17.           High Blood Pressure Screen (Female) on  02/13/17.           High Blood Pressure Screen (Female) on  01/27/17.           Obesity Screen and Counseling (Female) on  01/16/18.           Obesity Screen and Counseling (Female) on  10/12/17.           Obesity Screen and Counseling (Female) on  09/18/17.           Obesity Screen and Counseling (Female) on  04/03/17.           Obesity Screen and Counseling (Female) on  02/13/17.            Obesity Screen and Counseling (Female) on  01/27/17.           Tobacco Use Screen (Female) on  01/16/18.           Tobacco Use Screen (Female) on  10/12/17.           Tobacco Use Screen (Female) on  09/18/17.           Tobacco Use Screen (Female) on  04/03/17.           Tobacco Use Screen (Female) on  01/27/17.          Review / Management   Results review:  Lab results   10/12/2017 6:36 PM CDT Culture Strep A See comment   10/12/2017 6:00 PM CDT Group A Strep POC NOT DETECTED   .       Impression and Plan   Diagnosis     HTN (Hypertension) (JKE17-KX I10).     also possibly some white coat hypertension.  will have pt schedule nurse appt to check her blood pressure with bothour machine and pt's machine.  if discrepency then will order 24 hour blood pressure monitoring..     Plan:  Enroll in exercise program, Monitor blood pressure.         Diet: Sodium restricted.    Patient Instructions:       Counseled: Patient, Diet, Activity.    Diagnosis     return to clinic if symptoms worsen or do not improve.     Orders     Orders (Selected)   Outpatient Orders  Ordered (In Transit)  Basic Metabolic Panel* ([facility]): Specimen Type: Serum, Collection Date: 01/16/18 18:11:00 CST  Prescriptions  Prescribed  amlodipine-valsartan 5 mg-160 mg oral tablet: 1 tab(s), po, daily, # 90 tab(s), 3 Refill(s), Type: Maintenance, Pharmacy: [facility] #[id number], 1 tab(s) po daily  Documented Medications  Documented  calcium carbonate: 0 Refill(s), Type: Maintenance.

## 2022-02-16 NOTE — PROGRESS NOTES
Patient:   KENDAL HILARIO            MRN: [id number]            FIN: [id number]               Age:   59 years     Sex:  Female     :  1960   Associated Diagnoses:   Hyperlipidemia; Type II diabetes mellitus; White coat syndrome with hypertension   Author:   Gaurang Correa MD      Visit Information      Date of Service: 2019 05:55 pm  Performing Location: [facility]  Encounter#: [id number]      Primary Care Provider (PCP):  Marcelle Nash MD    NPI# [id number]      Referring Provider:  Gaurang Correa MD    NPI# [id number]      Chief Complaint   2019 6:09 PM CDT    f/u chronic - review labs              Additional Information:No additional information recorded during visit.   Chief complaint and symptoms as noted above and confirmed with patient.  Recent lab and diagnostic studies reviewed with patient      History of Present Illness   2019: Kendal returns for follow-up.  Diagnosed with type 2 diabetes earlier this year after returning from a cruise.  A1c at that time was 7.9%.  Introduced to metformin extended release and currently taking at thousand milligrams daily.  She has try to work on lifestyle improvements including reduction of carbohydrates and increasing physical activity.  A1c has improved considerably with these changes.  Questioning whether she still needs to remain on metformin.  Also significant family history of colorectal cancer.  Overdue for 5-year follow-up colonoscopy.  She had a generally a poor experience with colonoscopy back in  related to ineffective sedation with Versed.  Inquiring whether there would be any other alternative anesthesia options.         Review of Systems   Constitutional:  No fever, No chills.    Eye:  Negative except as documented in history of present illness.    Ear/Nose/Mouth/Throat:  Negative except as documented in history of present illness.    Respiratory:  No shortness of breath.    Cardiovascular:  No chest pain, No palpitations,  No peripheral edema, No syncope.    Gastrointestinal:  No nausea, No vomiting, No abdominal pain.    Genitourinary:  No dysuria, No hematuria.    Hematology/Lymphatics:  Negative except as documented in history of present illness.    Endocrine:  No excessive thirst, No polyuria.    Immunologic:  No recurrent fevers.    Musculoskeletal:  No joint pain, No muscle pain.    Neurologic:  Alert and oriented X4, No numbness, No tingling, No headache.       Health Status   Allergies:    Allergic Reactions (Selected)  Severity Not Documented  Adhesive tape (No reactions were documented)  Codeine (Nausea.)   Medications:  (Selected)   Prescriptions  Prescribed  amlodipine-valsartan 5 mg-160 mg oral tablet: 1 tab(s), Oral, daily, Instructions: office visit before next refill please, # 90 tab(s), 3 Refill(s), HUEY, Type: Maintenance, Pharmacy: [facility] [id number], 1 tab(s) Oral daily,Instr:office visit before next refill please  atorvastatin 20 mg oral tablet: = 1 tab(s) ( 20 mg ), Oral, daily, # 90 tab(s), 3 Refill(s), Type: Maintenance, Pharmacy: [facility] #[id number], 1 tab(s) Oral daily  contour next test strips: contour next test strips, See Instructions, Instructions: testing 2x/ day, Supply, # 100 EA, 2 Refill(s), Type: Maintenance, Pharmacy: [facility] [id number], testing 2x/ day  metFORMIN 500 mg oral tablet, extended release: = 4 tab(s) ( 2,000 mg ), PO, Daily, Instructions: One daily with supper. Increase by one tablet each week to four tabs daily, # 360 tab(s), 3 Refill(s), Type: Maintenance, Pharmacy: [facility] [id number], 4 tab(s) Oral daily,Instr:One daily with...  microlet lancets: microlet lancets, See Instructions, Instructions: testing 2x/ day, Supply, # 1 box(es), 3 Refill(s), Type: Maintenance, Pharmacy: [facility] [id number], testing 2x/ day  Documented Medications  Documented  Aspir 81: ( 81 mg ), po, daily, 0 Refill(s), Type: Maintenance  Multivitamin: Multivitamin, Supply, 0  Refill(s), Type: Maintenance  Vitamin D3 2000 intl units oral tablet: = 1 tab(s) ( 2,000 International Unit ), po, bid, 0 Refill(s), Type: Maintenance  ZyrTEC: ( 10 mg ), Oral, daily, 0 Refill(s), Type: Maintenance  calcium carbonate: ( 600 mg ), Oral, bid, 0 Refill(s), Type: Maintenance  non-medicated sleep aid: non-medicated sleep aid, 1 tab, Oral, hs, Supply, 0 Refill(s), Type: Maintenance,    Medications          *denotes recorded medication          *Multivitamin: Multivitamin, Supply, 0 Refill(s), Type: Maintenance.          *non-medicated sleep aid: 1 tab, Oral, hs, 0 Refill(s).          contour next test strips: See Instructions, testing 2x/ day, 100 EA, 2 Refill(s).          microlet lancets: See Instructions, testing 2x/ day, 1 box(es), 3 Refill(s).          amlodipine-valsartan 5 mg-160 mg oral tablet: 1 tab(s), Oral, daily, office visit before next refill please, 90 tab(s), 3 Refill(s).          *Aspir 81: 81 mg, po, daily, 0 Refill(s).          atorvastatin 20 mg oral tablet: 20 mg, 1 tab(s), Oral, daily, 90 tab(s), 3 Refill(s).          *calcium carbonate: 600 mg, Oral, bid, 0 Refill(s).          *ZyrTEC: 10 mg, Oral, daily, 0 Refill(s).          *Vitamin D3 2000 intl units oral tablet: 2,000 International Unit, 1 tab(s), po, bid, 0 Refill(s).          metFORMIN 500 mg oral tablet, extended release: 2,000 mg, 4 tab(s), PO, Daily, One daily with supper. Increase by one tablet each week to four tabs daily, 360 tab(s), 3 Refill(s).       Problem list:    All Problems  Acute low back pain / SNOMED CT 922134179 / Confirmed  Allergic rhinitis / SNOMED CT 193588753 / Confirmed  Spotting between menses / SNOMED CT 955845312 / Confirmed  Closed fracture of left distal radius and ulna / SNOMED CT 53362059 / Confirmed  Dyslipidemia / SNOMED CT 9529221002 / Confirmed  White coat syndrome with hypertension / SNOMED CT 3400202440 / Confirmed  Breast cancer / SNOMED CT 566676792 / Confirmed  Obese / SNOMED CT  0682634604 / Probable  Depression, recurrent / SNOMED CT 225084382 / Confirmed  Type 2 diabetes, HbA1c goal < 7% / SNOMED CT 973772678 / Confirmed  Resolved: Pregnancy / SNOMED CT 652211202  Resolved: Pregnancy / SNOMED CT 982709092  Resolved: Pregnancy / SNOMED CT 742059135      Histories   Past Medical History:    Active  Closed fracture of left distal radius and ulna (22734058): Onset on 2012 at 51 years.  Allergic rhinitis (711557801)  Obese (7016920439)  Depression, recurrent (775727173)  Breast cancer (034348986)  White coat syndrome with hypertension (1252350824)  Spotting between menses (971121681)  Acute low back pain (340615751)  Resolved  Pregnancy (669664080):  Resolved on 1981 at 21 years.  Pregnancy (950188612):  Resolved on 1984 at 23 years.  Pregnancy (304745753):  Resolved on 1987 at 27 years.   Family History:    Diabetes mellitus  Sister  CA - Cancer of colon  Father ()  Colon polyps.  Aunt     Procedure history:    Colonoscopy (831002814) on 10/18/2013 at 53 Years.  Comments:  10/30/2013 7:40 AM CDT - Zia Eng MA  Normal colonoscopy, repeat in 5 years due to family hx colon cancer  Right and left skin-sparing mastectomy on 2012 at 51 Years.  [place] lymph node biopsy, right and left axilla on 2012 at 51 Years.  Sphincteroplasty/perineoplasty on 2002 at 41 Years.  Comments:  2012 12:00 PM CDT - Aziza Espinal  [facility]/Chevy Muñoz MD   - Spontaneous vaginal delivery ([id number]).  Comments:  5/10/2011 10:04 AM CDT - Teresa PICKENS, Bella  x 3 (,  and )  Left wrist surgery .   Social History:        Alcohol Assessment            Current, 1-2 times per month, 1 drinks/episode average.      Tobacco Assessment            Never      Substance Abuse Assessment            Never      Employment and Education Assessment            Employed, Work/School description: Home .      Home and Environment Assessment            Marital  status: .  Spouse/Partner name: Bulmaro.  3 children.      Nutrition and Health Assessment            Type of diet: Regular.      Exercise and Physical Activity Assessment            Exercise frequency: 3-4 times/week.  Exercise type: Walking.      Sexual Assessment            Sexually active: No.  Identifies as female, Sexual orientation: Straight or heterosexual.        Physical Examination   vital signs stable, as noted above   Vital Signs   9/19/2019 6:09 PM CDT Temperature Tympanic 98.1 DegF    Peripheral Pulse Rate 88 bpm    Systolic Blood Pressure 130 mmHg    Diastolic Blood Pressure 76 mmHg    Mean Arterial Pressure 94 mmHg      Measurements from flowsheet : Measurements   9/19/2019 6:09 PM CDT    Weight Measured - Standard                158.8 lb     General:  Alert and oriented, No acute distress.    Eye:  Extraocular movements are intact.    HENT:  Normocephalic, Oral mucosa is moist.    Neck:  Supple, No carotid bruit, No jugular venous distention, No lymphadenopathy.    Respiratory:  Lungs are clear to auscultation, Respirations are non-labored.    Cardiovascular:  Normal rate, No murmur, No edema.    Gastrointestinal:  Soft, Non-distended, Normal bowel sounds, No organomegaly.    Musculoskeletal:  Normal range of motion, Normal strength, No tenderness.    Neurologic:  Alert, Oriented, Normal motor function, No focal deficits.    Cognition and Speech:  Oriented, Speech clear and coherent.    Psychiatric:  Appropriate mood & affect.       Review / Management   Results review:  Lab results   9/14/2019 8:14 AM CDT Hgb A1c 5.8  HI    Cholesterol 229 mg/dL  HI    Non-  HI    HDL 54 mg/dL    Chol/HDL Ratio 4.2      HI    Triglyceride 174 mg/dL  HI    U Microalbumin <0.2 mg/dL    Ur Creatinine 30 mg/dL    Ur Microalb/Creat Ratio NOTE   .       Impression and Plan   Diagnosis     Hyperlipidemia (WLG01-MI E78.5).     White coat syndrome with hypertension (XRN05-JK I10).     Type II diabetes  mellitus (HLN67-TZ E11.9).       .) type II DM, controlled  hypoglycemic medications: metformin ER 1000mg daily   - will with reduction to 500mg daily and monitoring BS  insulin therapy:  last HbA1c: 5.8% (significant improvement with dietary control)  microvascular complications: none known  macrovascular complications: none known  ASA/KAVYA/statin:  ASA 81mg daily, starting on atorvastatin 20mg qhs    .) hypertension, controlled  current antihypertensive regimen: amlodipine/valsartan 5/160mg daily   regimen changes:  intolerance:  future titration/work-up plan:     .) health maintenance  - overdue for CRC screening f/u (family h/o CRC); arrange for colonoscopy (of note, poor experience with conscious sedation, would like to explore alternative sedation options)    RTC in 6 months

## 2022-02-16 NOTE — NURSING NOTE
Comprehensive Intake Entered On:  9/23/2021 5:16 PM CDT    Performed On:  9/23/2021 5:15 PM CDT by Bella Moore CMA               Summary   Chief Complaint :   home readings   Menstrual Status :   Postmenopausal   Height Measured :   60 in(Converted to: 5 ft 0 in, 152.40 cm)    Systolic Blood Pressure :   118 mmHg   Diastolic Blood Pressure :   78 mmHg   Mean Arterial Pressure :   91 mmHg   BP Method :   Electronic   Bella Moore CMA - 9/23/2021 5:15 PM CDT   Consents   Consent for Immunization Exchange :   Consent Granted   Consent for Immunizations to Providers :   Consent Granted   Bella Moore CMA - 9/23/2021 6:04 PM CDT

## 2022-02-16 NOTE — PROGRESS NOTES
Patient:   KENDAL HILARIO            MRN: [id number]            FIN: [id number]               Age:   60 years     Sex:  Female     :  1960   Associated Diagnoses:   Hyperlipidemia; White coat syndrome with hypertension; Type II diabetes mellitus   Author:   Gaurang Correa MD      Visit Information      Date of Service: 2020 01:27 pm  Performing Location: [facility]  Encounter#: [id number]      Primary Care Provider (PCP):  Gaurang Correa MD    NPI# [id number]      Referring Provider:  Guarang Correa MD    NPI# [id number]      Chief Complaint   2020 1:39 PM CDT    f/u chronic            Additional Information:No additional information recorded during visit.   Chief complaint and symptoms as noted above and confirmed with patient.  Recent lab and diagnostic studies reviewed with patient      History of Present Illness   2019: Kendal returns for follow-up.  Diagnosed with type 2 diabetes earlier this year after returning from a cruise.  A1c at that time was 7.9%.  Introduced to metformin extended release and currently taking at thousand milligrams daily.  She has try to work on lifestyle improvements including reduction of carbohydrates and increasing physical activity.  A1c has improved considerably with these changes.  Questioning whether she still needs to remain on metformin.  Also significant family history of colorectal cancer.  Overdue for 5-year follow-up colonoscopy.  She had a generally a poor experience with colonoscopy back in  related to ineffective sedation with Versed.  Inquiring whether there would be any other alternative anesthesia options.    2020: Kendal presents for regular follow-up.  Overall doing well.  Reviewed labs from  demonstrating stable diabetic control.  Has tolerated use of metformin extended release 2 tablets in the morning.  Blood pressures well controlled based on home readings.  She has brought in blood pressure cuff for correlation in the past.   Would like to move forward with colonoscopy scheduling here locally.  Several inquiries regarding school openings.         Review of Systems   Constitutional:  No fever, No chills.    Eye:  Negative except as documented in history of present illness.    Ear/Nose/Mouth/Throat:  Negative except as documented in history of present illness.    Respiratory:  No shortness of breath.    Cardiovascular:  No chest pain, No palpitations, No peripheral edema, No syncope.    Gastrointestinal:  No nausea, No vomiting, No abdominal pain.    Genitourinary:  No dysuria, No hematuria.    Hematology/Lymphatics:  Negative except as documented in history of present illness.    Endocrine:  No excessive thirst, No polyuria.    Immunologic:  No recurrent fevers.    Musculoskeletal:  No joint pain, No muscle pain.    Neurologic:  Alert and oriented X4, No numbness, No tingling, No headache.       Health Status   Allergies:    Allergic Reactions (Selected)  Severity Not Documented  Adhesive tape (No reactions were documented)  Codeine (Nausea.)   Medications:  (Selected)   Prescriptions  Prescribed  MetFORMIN (Eqv-Glucophage XR) 500 mg oral tablet, extended release: = 4 tab(s) ( 2,000 mg ), Oral, daily, # 120 tab(s), 0 Refill(s), Type: Maintenance, Pharmacy: [facility] #[id number], Labs and OV due for further refills  amlodipine-valsartan 5 mg-160 mg oral tablet: 1 tab(s), Oral, daily, Instructions: No further refills, until med f/u visit with provider., # 9 tab(s), 0 Refill(s), HUEY, Type: Maintenance, Pharmacy: [facility] #[id number], appt 7/1, 1 tab(s) Oral daily,Instr:No further refills, until med f/u...  atorvastatin 20 mg oral tablet: = 1 tab(s) ( 20 mg ), Oral, daily, # 90 tab(s), 3 Refill(s), Type: Maintenance, Pharmacy: [facility] #[id number], 1 tab(s) Oral daily  contour next test strips: contour next test strips, See Instructions, Instructions: testing 2x/ day, Supply, # 100 EA, 2 Refill(s), Type: Maintenance,  Pharmacy: [id number], testing 2x/ day  microlet lancets: microlet lancets, See Instructions, Instructions: testing 2x/ day, Supply, # 1 box(es), 3 Refill(s), Type: Maintenance, Pharmacy: [facility] [id number], testing 2x/ day  Documented Medications  Documented  Aspir 81: ( 81 mg ), po, daily, 0 Refill(s), Type: Maintenance  Multivitamin: Multivitamin, Supply, 0 Refill(s), Type: Maintenance  Vitamin D3 2000 intl units oral tablet: = 1 tab(s) ( 2,000 International Unit ), po, bid, 0 Refill(s), Type: Maintenance  ZyrTEC: ( 10 mg ), Oral, daily, 0 Refill(s), Type: Maintenance  calcium carbonate: ( 600 mg ), Oral, bid, 0 Refill(s), Type: Maintenance  non-medicated sleep aid: non-medicated sleep aid, 1 tab, Oral, hs, Supply, 0 Refill(s), Type: Maintenance,    Medications          *denotes recorded medication          *Multivitamin: Multivitamin, Supply, 0 Refill(s), Type: Maintenance.          *non-medicated sleep aid: 1 tab, Oral, hs, 0 Refill(s).          contour next test strips: See Instructions, testing 2x/ day, 100 EA, 2 Refill(s).          microlet lancets: See Instructions, testing 2x/ day, 1 box(es), 3 Refill(s).          amlodipine-valsartan 5 mg-160 mg oral tablet: 1 tab(s), Oral, daily, No further refills, until med f/u visit with provider., 9 tab(s), 0 Refill(s).          *Aspir 81: 81 mg, po, daily, 0 Refill(s).          atorvastatin 20 mg oral tablet: 20 mg, 1 tab(s), Oral, daily, 90 tab(s), 3 Refill(s).          *calcium carbonate: 600 mg, Oral, bid, 0 Refill(s).          *ZyrTEC: 10 mg, Oral, daily, 0 Refill(s).          *Vitamin D3 2000 intl units oral tablet: 2,000 International Unit, 1 tab(s), po, bid, 0 Refill(s).          MetFORMIN (Eqv-Glucophage XR) 500 mg oral tablet, extended release: 2,000 mg, 4 tab(s), Oral, daily, 120 tab(s), 0 Refill(s).       Problem list:    All Problems  Acute low back pain / SNOMED CT 244646960 / Confirmed  Allergic rhinitis / SNOMED CT 850322977 /  Confirmed  Spotting between menses / SNOMED CT 784194746 / Confirmed  Closed fracture of left distal radius and ulna / SNOMED CT 79981802 / Confirmed  Dyslipidemia / SNOMED CT 7540430545 / Confirmed  White coat syndrome with hypertension / SNOMED CT 2684083715 / Confirmed  Breast cancer / SNOMED CT 344446432 / Confirmed  Obese / SNOMED CT 5234896660 / Probable  Depression, recurrent / SNOMED CT 799490693 / Confirmed  Type 2 diabetes, HbA1c goal < 7% / SNOMED CT 103913418 / Confirmed  Resolved: Pregnancy / SNOMED CT 562095087  Resolved: Pregnancy / SNOMED CT 792797541  Resolved: Pregnancy / SNOMED CT 821921085      Histories   Past Medical History:    Active  Closed fracture of left distal radius and ulna (66293624): Onset on 2012 at 51 years.  Allergic rhinitis (147582634)  Obese (6340308474)  Depression, recurrent (934460332)  Breast cancer (685032785)  White coat syndrome with hypertension (6829371138)  Spotting between menses (766151803)  Acute low back pain (348715540)  Resolved  Pregnancy (189038320):  Resolved on 1981 at 21 years.  Pregnancy (869457527):  Resolved on 1984 at 23 years.  Pregnancy (846751604):  Resolved on 1987 at 27 years.   Family History:    Diabetes mellitus  Sister  CA - Cancer of colon  Father ()  Colon polyps.  Aunt     Procedure history:    Colonoscopy (262509533) on 10/18/2013 at 53 Years.  Comments:  10/30/2013 7:40 AM CDT - Zia Eng MA  Normal colonoscopy, repeat in 5 years due to family hx colon cancer  Right and left skin-sparing mastectomy on 2012 at 51 Years.  [place] lymph node biopsy, right and left axilla on 2012 at 51 Years.  Sphincteroplasty/perineoplasty on 2002 at 41 Years.  Comments:  2012 12:00 PM CDT - Aziza Espinal  [facility]/Chevy Muñoz MD   - Spontaneous vaginal delivery ([id number]).  Comments:  5/10/2011 10:04 AM CDT - Bella Moore CMA  x 3 (,  and )  Left wrist surgery .   Social History:         Alcohol Assessment            Current, 1-2 times per month, 1 drinks/episode average.      Tobacco Assessment            Never      Substance Abuse Assessment            Never      Employment and Education Assessment            Employed, Work/School description: Home .      Home and Environment Assessment            Marital status: .  Spouse/Partner name: Bulmaro.  3 children.      Nutrition and Health Assessment            Type of diet: Regular.      Exercise and Physical Activity Assessment            Exercise frequency: 3-4 times/week.  Exercise type: Walking.      Sexual Assessment            Sexually active: No.  Identifies as female, Sexual orientation: Straight or heterosexual.        Physical Examination   vital signs stable, as noted above   Vital Signs   8/11/2020 1:39 PM CDT Temperature Tympanic 98.8 DegF    Peripheral Pulse Rate 84 bpm    Systolic Blood Pressure 136 mmHg  HI    Diastolic Blood Pressure 64 mmHg    Mean Arterial Pressure 88 mmHg      Measurements from flowsheet : Measurements   8/11/2020 1:39 PM CDT Height Measured - Standard 60 in    Weight Measured - Standard 160 lb    BSA 1.75 m2    Body Mass Index 31.24 kg/m2  HI      General:  Alert and oriented, No acute distress.    Eye:  Extraocular movements are intact.    HENT:  Normocephalic, Oral mucosa is moist.    Neck:  Supple, No carotid bruit, No jugular venous distention, No lymphadenopathy.    Respiratory:  Lungs are clear to auscultation, Respirations are non-labored.    Cardiovascular:  Normal rate, No murmur, No edema.    Gastrointestinal:  Soft, Non-distended, Normal bowel sounds, No organomegaly.    Musculoskeletal:  Normal range of motion, Normal strength, No tenderness.    Feet:  Normal by visual exam, Normal pulses.    Neurologic:  Alert, Oriented, Normal motor function, No focal deficits.    Cognition and Speech:  Oriented, Speech clear and coherent.    Psychiatric:  Appropriate mood & affect.       Review /  Management   Results review:  Lab results   6/26/2020 8:33 AM CDT Sodium Level 138 mmol/L    Potassium Level 4.2 mmol/L    Chloride Level 103 mmol/L    CO2 Level 27 mmol/L    Glucose Level 117 mg/dL  HI    BUN 17 mg/dL    Creatinine 0.80 mg/dL    BUN/Creat Ratio NOT APPLICABLE    eGFR 80 mL/min/1.73m2    eGFR African American 93 mL/min/1.73m2    Calcium Level 9.9 mg/dL    Hgb A1c 6.0  HI   .       Impression and Plan   Diagnosis     Hyperlipidemia (EDP90-MY E78.5).     White coat syndrome with hypertension (MFE63-XD I10).     Type II diabetes mellitus (FPK08-CP E11.9).       .) type II DM, controlled  hypoglycemic medications: metformin ER 1000mg daily  insulin therapy:  last HbA1c: 6%   microvascular complications: none known  macrovascular complications: none known  ASA/KAVYA/statin:  ASA 81mg daily, atorvastatin 20mg qhs    .) hypertension, controlled  current antihypertensive regimen: amlodipine/valsartan 5/160mg daily   regimen changes:  intolerance:  future titration/work-up plan:     .) health maintenance  - overdue for CRC screening f/u (family h/o CRC); arrange for colonoscopy (of note, poor experience with conscious sedation, would like to explore alternative sedation options)  - h/o bilateral mastectomy  - due for Shingrix  - last PAP 10/2018    .) Covid19 pandemic  - discussed importance of social distancing, hand hygiene, and unique aspects related to individualized health  - discussed s/s and appropriate measures in context of worsening or developing respiratory symptoms     RTC in 12 months

## 2022-02-16 NOTE — NURSING NOTE
Hearing and Vision Screening Entered On:  9/19/2019 6:16 PM CDT    Performed On:  9/19/2019 6:16 PM CDT by Bella Moore CMA               Hearing and Vision Screening   Audiogram Result Right Ear :   Pass   Audiogram Result Left Ear :   Pass   Bella Moore CMA - 9/19/2019 6:16 PM CDT

## 2022-02-16 NOTE — LETTER
(Inserted Image. Unable to display)   September 15, 2019      KENDAL HILARIO      [address], [place] [id number]        Dear KENDAL,    Thank you for selecting [facility] (previously [facility]) for your healthcare needs.  Below you will find the results of your recent tests done at our clinic.     Diabetes controlled. Cholesterol still high. Please schedule an appointment.      Result Name Current Result Previous Result Reference Range   Hgb A1c ((H)) 5.8 9/14/2019 ((H)) 7.9 3/28/2019  - <5.7   Cholesterol (mg/dL) ((H)) 229 9/14/2019 ((H)) 264 3/28/2019  - <200   Non-HDL Cholesterol ((H)) 175 9/14/2019 ((H)) 212 3/28/2019  - <130   HDL (mg/dL)  54 9/14/2019  52 3/28/2019 >50 -    Cholesterol/HDL Ratio  4.2 9/14/2019 ((H)) 5.1 3/28/2019  - <5.0   LDL ((H)) 144 9/14/2019 ((H)) 179 3/28/2019    Triglyceride (mg/dL) ((H)) 174 9/14/2019 ((H)) 176 3/28/2019  - <150   U Microalbumin (mg/dL)  <0.2 9/14/2019  See Note: -        Please contact me or my assistant at [phone] if you have any questions.     Sincerely,        Yoav Vega MD

## 2022-02-16 NOTE — NURSING NOTE
Medication Refill    PCP:   _ PARUL    Name of med requested:  _ atorvastatin, amlodipine-valsartan    Sent in a 30 day supply as patient has an appt on 9-23-21

## 2022-02-16 NOTE — CARE COORDINATION
Pt appears on _RBS__ chronic disease panel as out of parameters for _HTN_  RTC letter went out on 10/13/2017, will wait for pt response

## 2022-02-16 NOTE — PROGRESS NOTES
Chief Complaint    HTN check up.  History of Present Illness      Patient comes in for a blood pressure refill.  She has been doing well on the amlodipine and valsartan combo pill.  She brings in her home records of blood pressures which she has checked with her own cough and her sister's cough.  All the readings are perfect range.  Review of Systems      She notes that her cough has resolved spontaneously.  Physical Exam   Vitals & Measurements    HR: 78(Peripheral)  BP: 168/88     WT: 180.4 lb       Overall she appears very well.  Assessment/Plan       HTN (Hypertension)         Continue amlodipine valsartan combination.  She is tolerating this well.         Ordered:          amlodipine-benazepril, 1 cap(s), PO, qhs, # 90 cap(s), 3 Refill(s), Type: Maintenance, Pharmacy: [facility] #[id number], 1 cap(s) po qhs          018[address] office outpatient visit 15 minutes (Charge), Quantity: 1, HTN (Hypertension)          Basic Metabolic Panel* ([facility]), Specimen Type: Serum, Collection Date: 09/18/17 14:35:00 CDT                Orders:         amlodipine-valsartan, 0.5 tab(s), po, daily, # 45 tab(s), 1 Refill(s), Type: Maintenance, Pharmacy: [facility] #[id number], 0.5 tab(s) po daily         benzonatate, 1 cap(s) ( 100 mg ), PO, TID, PRN: as needed for cough, # 20 cap(s), 0 Refill(s), Type: Maintenance, Pharmacy: [facility] #[id number], 1 cap(s) po tid,x7 day(s),PRN:as needed for cough         codeine-guaifenesin, 5 mL, po, qhs, PRN: as needed for cough, # 120 mL, 0 Refill(s), Type: Maintenance  Problem List/Past Medical History    Ongoing     Acute Low Back Pain     Allergic Rhinitis     Breast Cancer     Closed fracture of left distal radius and ulna     Depression, Recurrent     HTN (Hypertension)     Obese     Spotting between Menses    Historical  Procedure/Surgical History     Colonoscopy (10/18/2013)     Right and left skin-sparing mastectomy (06/04/2012)     [place] lymph node biopsy, right and left axilla  (2012)     Sphincteroplasty/perineoplasty (2002)     Left wrist surgery       - Spontaneous vaginal delivery  Medications    amlodipine-valsartan 5 mg-160 mg oral tablet, 0.5 tab(s), po, daily, 1 refills    Aspir 81, 81 mg, po, daily    Flonase 50 mcg/inh nasal spray, 2 spray(s), nasal, daily, 11 refills    Multivitamin    Vitamin D3 2000 intl units oral tablet, 2000 International Unit= 1 tab(s), po, daily  Allergies    adhesive tape    codeine (Nausea.)  Social History    Smoking Status - 2017     Never smoker     Alcohol - 2013      Current, 1-2 times per month, 1 drinks/episode average.     Employment and Education - 2013      Employed, Work/School description: [employer].     Exercise and Physical Activity - 2013      Exercise frequency: 3-4 times/week. Exercise type: Walking.     Home and Environment - 2013      Marital status: . Spouse/Partner name: Bulmaro. Ale children.     Sexual - 2013      Sexually active: Yes. Sexual orientation: Heterosexual.     Substance Abuse - 2013      Never     Tobacco - 2013      Never  Family History    CA - Cancer of colon: Father.    Colon polyps.: Aunt.    Diabetes mellitus: Sister.  Immunizations      Vaccine Date Status Comments      tetanus/diphth/pertuss (Tdap) adult/adol 2012 Given Pt gave verbal consent.      influenza 10/23/2003 Recorded      Td 2003 Recorded      tetanus 2000 Recorded      Td 1983 Recorded  Lab Results      Results (Last 90 days)      No results located.

## 2022-02-16 NOTE — PROGRESS NOTES
Patient:   KENDAL HILARIO            MRN: [id number]            FIN: [id number]               Age:   56 years     Sex:  Female     :  1960   Associated Diagnoses:   None   Author:   Jake Monsalve MD      Visit Information      Date of Service: 2017 06:01 pm  Performing Location: [facility]  Encounter#: [id number]      Primary Care Provider (PCP):  Jake Monsalve MD    NPI# [id number]      Referring Provider:  No referring provider recorded for selected visit.      Chief Complaint   4/3/2017 6:08 PM CDT     Pt c/o cough. Started after she started her BP meds and unsure if it is a could or from her meds.        History of Present Illness   CC as above and reviewed w  patient   Pt complains of a dry cough since starting benazepril-amlodipine. She also has chronic allergy issues. Lately cough feels like it's moved intoh her chest and sh'es had some greenish sputum. No fevers or shortness of breath. She reports excellent control of her bp at home on her home cuff and feels better on the meds before she started them      Review of Systems            Health Status   Allergies:    Allergic Reactions (Selected)  Severity Not Documented  Adhesive tape (No reactions were documented)  Codeine (Nausea.)   Medications:  (Selected)   Prescriptions  Prescribed  Flonase 50 mcg/inh nasal spray: 2 spray(s), nasal, daily, Instructions: Once symptoms controlled reduce to one spray., # 1 EA, 11 Refill(s), Type: Maintenance, Pharmacy: [facility] #[id number], 2 spray(s) nasal daily,Instr:Once symptoms controlled reduce to one spray.  Robitussin-AC 10 mg-100 mg/5 mL oral syrup: 5 mL, po, qhs, PRN: as needed for cough, # 120 mL, 0 Refill(s), Type: Maintenance  Tessalon Perles 100 mg oral capsule: 1 cap(s) ( 100 mg ), PO, TID, PRN: as needed for cough, # 20 cap(s), 0 Refill(s), Type: Maintenance, Pharmacy: [facility] #[id number], 1 cap(s) po tid,x7 day(s),PRN:as needed for cough  amlodipine-benazepril 2.5 mg-10  mg oral capsule: 1 cap(s), PO, qhs, # 90 cap(s), 3 Refill(s), Type: Maintenance, Pharmacy: [facility] #[id number], 1 cap(s) po qhs  amlodipine-valsartan 5 mg-160 mg oral tablet: 0.5 tab(s), po, daily, # 15 tab(s), 3 Refill(s), Type: Maintenance, Pharmacy: [facility] #[id number], 0.5 tab(s) po daily  Documented Medications  Documented  Aspir 81: ( 81 mg ), po, daily, 0 Refill(s), Type: Maintenance  Multivitamin: Multivitamin, Supply, 0 Refill(s), Type: Maintenance  Vitamin D3 2000 intl units oral tablet: 1 tab(s) ( 2,000 International Unit ), po, daily, 0 Refill(s), Type: Maintenance  tamoxifen 10 mg oral tablet: 1 tab(s) ( 10 mg ), PO, BID, # 60 tab(s), 0 Refill(s), Type: Maintenance   Problem list:    All Problems  Acute Low Back Pain / ICD-9-.2 / Confirmed  Allergic Rhinitis / ICD-9- / Confirmed  Breast Cancer / ICD-9-.9 / Confirmed  Closed fracture of left distal radius and ulna / ICD-9-.44 / Confirmed  Depression, Recurrent / ICD-9-.30 / Confirmed  HTN (Hypertension) / ICD-9-.9 / Confirmed  Obese / ICD-9-.00 / Probable  Spotting between Menses / ICD-9-.6 / Confirmed      Histories   Past Medical History:    Active  Depression, Recurrent (296.30)   Family History:    Diabetes mellitus  Sister  CA - Cancer of colon  Father ()  Colon polyps.  Aunt     Procedure history:    Colonoscopy ([id number]) on 10/18/2013 at 53 Years.  Comments:  10/30/2013 7:40 AM - Zia Eng MA  Normal colonoscopy, repeat in 5 years due to family hx colon cancer  Right and left skin-sparing mastectomy on 2012 at 51 Years.  [place] lymph node biopsy, right and left axilla on 2012 at 51 Years.  Sphincteroplasty/perineoplasty on 2002 at 41 Years.  Comments:  2012 12:00 PM - Aziza Espinal  [facility]/Chevy Muñoz MD   - Spontaneous vaginal delivery ([id number]).  Comments:  5/10/2011 10:04 AM - Bella Moore CMA  x 3 (,  and )  Left wrist surgery .    Social History:        Alcohol Assessment            Current, 1-2 times per month, 1 drinks/episode average.      Tobacco Assessment            Never      Substance Abuse Assessment            Never      Employment and Education Assessment            Employed, Work/School description: [employer].      Home and Environment Assessment            Marital status: .  Spouse/Partner name: Bulmaro.  3 children.      Exercise and Physical Activity Assessment            Exercise frequency: 3-4 times/week.  Exercise type: Walking.      Sexual Assessment            Sexually active: Yes.  Sexual orientation: Heterosexual.        Physical Examination   Vital Signs   4/3/2017 6:08 PM CDT Temperature Tympanic 98.6 DegF    Peripheral Pulse Rate 103 bpm  HI    Systolic Blood Pressure 158 mmHg  HI    Diastolic Blood Pressure 100 mmHg  HI    Mean Arterial Pressure 119 mmHg    Oxygen Saturation 98 %      Measurements from flowsheet : Measurements   4/3/2017 6:08 PM CDT Height Measured - Standard 60 in    Weight Measured - Standard 181 lb    BSA 1.86 m2    Body Mass Index 35.35 kg/m2      ENT: External auditory canals clear. TMs normal bilaterally. Normal oropharynx. Moist mucus membranes   CV: RRR w/o MRG. Normal S1 and S2   Resp: Clear to auscultation b/l. Normal breath sounds without wheezes or crackles. No increased work of breathing.       Review / Management   Results review:  Lab results   2/13/2017 6:04 PM CST Sodium Level 139 mmol/L (Modified)    Potassium Level 4.0 mmol/L (Modified)    Chloride Level 104 mmol/L (Modified)    CO2 Level 25 mmol/L (Modified)    Glucose Level 105 mg/dL  HI (Modified)    BUN 10 mg/dL (Modified)    Creatinine 0.75 mg/dL (Modified)    BUN/Creat Ratio NOT APPLICABLE (Modified)    eGFR 88 mL/min/1.73m2 (Modified)    eGFR African American 103 mL/min/1.73m2 (Modified)    Calcium Level 9.5 mg/dL (Modified)   1/16/2017 6:53 PM CST Sodium Level 138 mmol/L     Potassium Level 3.9 mmol/L      Chloride Level 103 mmol/L     CO2 Level 22 mmol/L     Glucose Level 118 mg/dL  HI     BUN 9 mg/dL     Creatinine 0.76 mg/dL     BUN/Creat Ratio NOT APPLICABLE     eGFR 87 mL/min/1.73m2     eGFR African American 101 mL/min/1.73m2     Calcium Level 9.2 mg/dL     Cholesterol 206 mg/dL  HI     Non-  HI     HDL 45 mg/dL  LOW     Chol/HDL Ratio 4.6     LDL 93     Triglyceride 341 mg/dL  HI     TSH 0.87 mIU/L     UA Color YELLOW     UA Appear CLEAR     UA pH < OR = 5.0     UA Specific Gravity 1.009     UA Glucose NEGATIVE     UA Bilirubin NEGATIVE     UA Ketones TRACE     UA Blood 1+     UA Protein NEGATIVE     UA Nitrite NEGATIVE     UA Leuk Est NEGATIVE     UA Squam Epithelial 0-5 /HPF     UA Hyaline Cast NONE SEEN /LPF     UA WBC NONE SEEN /HPF     UA RBC 0-2 /HPF     UA Bacteria NONE SEEN /HPF    12/29/2016 8:14 AM CST WBC 5.8     RBC 4.80     Hgb 14.0 g/dL     Hct 41.9 %     MCV 87 fL     MCH 29.2 pg     MCHC 33.4 g/dL     RDW 12.9 %     Platelet 179     MPV 9.2 fL    .       Impression and Plan   Cough - chronic aspect could be ace - i related. She seems to be devrloping an infectious compoennt now. HAd pneumonia not too longa go. Treat symptomatically w tessalon perels and robitussin ac. f/u if not improving    HTN  - likely white coat element as pt reports good control at home now on meds  - stop amlodipine - benazepril switch to amlodipine - valsartan .

## 2022-02-16 NOTE — NURSING NOTE
Comprehensive Intake Entered On:  9/19/2019 6:14 PM CDT    Performed On:  9/19/2019 6:09 PM CDT by Bella Moore CMA               Summary   Chief Complaint :   f/u chronic - review labs    Menstrual Status :   Postmenopausal   Weight Measured :   158.8 lb(Converted to: 158 lb 13 oz, 72.03 kg)    Systolic Blood Pressure :   130 mmHg   Diastolic Blood Pressure :   76 mmHg   Mean Arterial Pressure :   94 mmHg   Peripheral Pulse Rate :   88 bpm   Temperature Tympanic :   98.1 DegF(Converted to: 36.7 DegC)    Bella Moore CMA - 9/19/2019 6:09 PM CDT   Health Status   Allergies Verified? :   Yes   Medication History Verified? :   Yes   Medical History Verified? :   Yes   Pre-Visit Planning Status :   Completed   Tobacco Use? :   Never smoker   Bella Moore CMA - 9/19/2019 6:09 PM CDT   Social History   Social History   (As Of: 9/19/2019 6:14:18 PM CDT)   Alcohol:        Current, 1-2 times per month, 1 drinks/episode average.   (Last Updated: 5/10/2011 10:06:08 AM CDT by Bella Moore CMA)          Tobacco:        Never   (Last Updated: 5/10/2011 10:06:08 AM CDT by Bella Moore CMA)          Substance Abuse:        Never   (Last Updated: 5/10/2011 10:06:09 AM CDT by Bella Moore CMA)          Employment/School:        Employed, Work/School description: Home .   (Last Updated: 11/12/2018 7:28:11 AM CST by Leigh Ann Brown)          Home/Environment:        Marital status: .  Spouse/Partner name: Aubree Aguilera children.   (Last Updated: 8/6/2013 3:28:22 PM CDT by Vijaya Turcios CMA)          Nutrition/Health:        Type of diet: Regular.   (Last Updated: 11/12/2018 7:28:18 AM CST by Leigh Ann Brown)          Exercise:        Exercise frequency: 3-4 times/week.  Exercise type: Walking.   (Last Updated: 8/6/2013 3:28:07 PM CDT by Vijaya Turcios CMA)          Sexual:        Sexually active: No.  Identifies as female, Sexual orientation: Straight or heterosexual.   (Last Updated: 11/12/2018 7:28:35 AM CST by Kevin  Ermias

## 2022-02-16 NOTE — LETTER
(Inserted Image. Unable to display)   February 20, 2019        KENDAL HILARIO  [address], [place] [id number]        Dear KENDAL,      Thank you for selecting [facility] (previously [facility]) for your healthcare needs.    Our records indicate you are due for the following services:     Annual Physical and Gynecologic Exam ~ Yearly wellness exams are important for your ongoing health and wellness.  This exam gives you the opportunity to meet with your Healthcare Provider to review your health, update immunizations and to recommend preventive screenings that you may be due for.  At your wellness visit, your health care provider will determine the need for a gynecologic exam and/or pap smear.     To schedule an appointment or if you have further questions, please contact your primary clinic:   [facility]       [phone]   Formerly [facility]       [phone]              [facility]     [phone]      Powered by [employer]    Sincerely,    Marcelle [facility] MD

## 2022-02-16 NOTE — PROGRESS NOTES
Patient:   KENDAL HILARIO            MRN: [id number]            FIN: [id number]               Age:   56 years     Sex:  Female     :  1960   Associated Diagnoses:   None   Author:   Jake Monsalve MD      Visit Information      Date of Service: 2017 04:51 pm  Performing Location: [facility]  Encounter#: [id number]      Primary Care Provider (PCP):  [id number] -UNKNOWN,      Referring Provider:  No referring provider recorded for selected visit.      Chief Complaint   2017 4:56 PM CST    f/up bronchitis.  Exposed to strep.        History of Present Illness   HTN - pt reports excellent blood pressure control on medication. She brings in her readings and they are all 120s-130s / 70s at home. Takes her medication at night and reports no adverse effects.   Rhinitis - has postansal drainage and nasal obstruction.  Needs f/u CXR for questionable lung lesion on last xr      Review of Systems         Resp - denies cough or difficulty breathing      Health Status   Allergies:    Allergic Reactions (Selected)  Severity Not Documented  Adhesive tape (No reactions were documented)  Codeine (Nausea.)   Medications:  (Selected)   Prescriptions  Prescribed  Flonase 50 mcg/inh nasal spray: 2 spray(s), nasal, daily, Instructions: Once symptoms controlled reduce to one spray., # 1 EA, 11 Refill(s), Type: Maintenance, Pharmacy: [facility] #[id number], 2 spray(s) nasal daily,Instr:Once symptoms controlled reduce to one spray.  amLODIPine-benazepril 2.5 mg-10 mg oral capsule: 1 cap(s), PO, qhs, # 30 cap(s), 0 Refill(s), Type: Maintenance, Pharmacy: [facility] #[id number], 1 cap(s) po qhs  Documented Medications  Documented  Aspir 81: ( 81 mg ), po, daily, 0 Refill(s), Type: Maintenance  Multivitamin: Multivitamin, Supply, 0 Refill(s), Type: Maintenance  Vitamin D3 2000 intl units oral tablet: 1 tab(s) ( 2,000 International Unit ), po, daily, 0 Refill(s), Type: Maintenance  tamoxifen 10 mg oral tablet: 1 tab(s) (  10 mg ), PO, BID, # 60 tab(s), 0 Refill(s), Type: Maintenance   Problem list:    All Problems  Acute Low Back Pain / ICD-9-.2 / Confirmed  Allergic Rhinitis / ICD-9- / Confirmed  Breast Cancer / ICD-9-.9 / Confirmed  Closed fracture of left distal radius and ulna / ICD-9-.44 / Confirmed  Depression, Recurrent / ICD-9-.30 / Confirmed  HTN (Hypertension) / ICD-9-.9 / Confirmed  Obese / ICD-9-.00 / Probable  Spotting between Menses / ICD-9-.6 / Confirmed      Histories   Past Medical History:    Active  Depression, Recurrent (296.30)   Family History:    Diabetes mellitus  Sister  [place] - Cancer of colon  Father ()  Colon polyps.  Aunt     Procedure history:    Colonoscopy ([id number]) on 10/18/2013 at 53 Years.  Comments:  10/30/2013 7:40 AM - Zia Eng MA  Normal colonoscopy, repeat in 5 years due to family hx colon cancer  Right and left skin-sparing mastectomy on 2012 at 51 Years.  [place] lymph node biopsy, right and left axilla on 2012 at 51 Years.  Sphincteroplasty/perineoplasty on 2002 at 41 Years.  Comments:  2012 12:00 PM - Aziza Espinal  [facility]/Chevy Muñoz MD   - Spontaneous vaginal delivery ([id number]).  Comments:  5/10/2011 10:04 AM - Teresa PICKENS, Bella  x 3 (,  and )  Left wrist surgery .   Social History:        Alcohol Assessment            Current, 1-2 times per month, 1 drinks/episode average.      Tobacco Assessment            Never      Substance Abuse Assessment            Never      Employment and Education Assessment            Employed, Work/School description: [employer].      Home and Environment Assessment            Marital status: .  Spouse/Partner name: Bulmaro.  Ale children.      Exercise and Physical Activity Assessment            Exercise frequency: 3-4 times/week.  Exercise type: Walking.      Sexual Assessment            Sexually active: Yes.  Sexual orientation:  Heterosexual.        Physical Examination   Vital Signs   2/13/2017 4:56 PM CST Temperature Tympanic 98.3 DegF    Peripheral Pulse Rate 114 bpm  HI    Systolic Blood Pressure 158 mmHg  HI    Diastolic Blood Pressure 95 mmHg  HI    Mean Arterial Pressure 116 mmHg      Measurements from flowsheet : Measurements   2/13/2017 4:56 PM CST    Weight Measured - Standard                177.6 lb       Gen - appears well  Nose - normal      Review / Management   Results review:  Lab results   1/16/2017 6:53 PM CST Sodium Level 138 mmol/L    Potassium Level 3.9 mmol/L    Chloride Level 103 mmol/L    CO2 Level 22 mmol/L    Glucose Level 118 mg/dL  HI    BUN 9 mg/dL    Creatinine 0.76 mg/dL    BUN/Creat Ratio NOT APPLICABLE    eGFR 87 mL/min/1.73m2    eGFR African American 101 mL/min/1.73m2    Calcium Level 9.2 mg/dL    Cholesterol 206 mg/dL  HI    Non-  HI    HDL 45 mg/dL  LOW    Chol/HDL Ratio 4.6    LDL 93    Triglyceride 341 mg/dL  HI    TSH 0.87 mIU/L    UA Color YELLOW    UA Appear CLEAR    UA pH < OR = 5.0    UA Specific Gravity 1.009    UA Glucose NEGATIVE    UA Bilirubin NEGATIVE    UA Ketones TRACE    UA Blood 1+    UA Protein NEGATIVE    UA Nitrite NEGATIVE    UA Leuk Est NEGATIVE    UA WBC NONE SEEN /HPF    UA RBC 0-2 /HPF    UA Squam Epithelial 0-5 /HPF    UA Bacteria NONE SEEN /HPF    UA Hyaline Cast NONE SEEN /LPF   12/29/2016 8:14 AM CST WBC 5.8    RBC 4.80    Hgb 14.0 g/dL    Hct 41.9 %    MCV 87 fL    MCH 29.2 pg    MCHC 33.4 g/dL    RDW 12.9 %    Platelet 179    MPV 9.2 fL   .    CXR - clear      Impression and Plan   Rhinitis - trial of flonase  HTN - controlled with amlodipine- benazepril. check BMP today on ACE  Lung lesion - resolved on my read of XR. Will await radiology interpretation.

## 2022-02-16 NOTE — CARE COORDINATION
"Pt appears on [facility] chronic disease panel as out of parameters for HTN.  Pt has not wanted to come in for CSS BP, per pt \"because it is always high at the clinic\".  Pt has RTC Well Adult 2/2019. Will place RTC CSS BP 1 week and see if pt responds or not.  "

## 2022-02-16 NOTE — LETTER
(Inserted Image. Unable to display)   March 31, 2019      KENDAL HILARIO      [address] [place], [place] [id number]        Dear KENDAL,    Thank you for selecting [facility] (previously [facility]) for your healthcare needs.  Below you will find the results of your recent tests done at our clinic.     You have type 2 diabetes and elevated cholesterol. Both may require oral medication. Please schedule an appointment in April.      Result Name Current Result Previous Result Reference Range   Sodium Level (mmol/L)  138 3/28/2019  139 1/16/2018 135 - 146   Potassium Level (mmol/L)  3.9 3/28/2019  3.8 1/16/2018 3.5 - 5.3   Chloride Level (mmol/L)  102 3/28/2019  102 1/16/2018 98 - 110   CO2 Level (mmol/L)  27 3/28/2019  29 1/16/2018 20 - 32   Glucose Level (mg/dL) ((H)) 111 3/28/2019 ((H)) 114 1/16/2018 65 - 99   BUN (mg/dL)  11 3/28/2019  10 1/16/2018 7 - 25   Creatinine Level (mg/dL)  0.61 3/28/2019  0.71 1/16/2018 0.50 - 1.05   eGFR (mL/min/1.73m2)  99 3/28/2019  94 1/16/2018 > OR = 60 -    Calcium Level (mg/dL)  9.7 3/28/2019  9.8 1/16/2018 8.6 - 10.4   Hgb A1c ((H)) 7.9 3/28/2019   - <5.7   Cholesterol (mg/dL) ((H)) 264 3/28/2019   - <200   Non-HDL Cholesterol ((H)) 212 3/28/2019   - <130   HDL (mg/dL)  52 3/28/2019  >50 -    Cholesterol/HDL Ratio ((H)) 5.1 3/28/2019   - <5.0   LDL ((H)) 179 3/28/2019     Triglyceride (mg/dL) ((H)) 176 3/28/2019   - <150       Please contact me or my assistant at [phone] if you have any questions.     Sincerely,        Yoav Vega MD

## 2022-02-16 NOTE — NURSING NOTE
Comprehensive Intake Entered On:  4/18/2019 4:27 PM CDT    Performed On:  4/18/2019 4:25 PM CDT by Dorothea Sewell MA               Summary   Chief Complaint :   follow up labs   Menstrual Status :   Postmenopausal   Weight Measured :   171 lb(Converted to: 171 lb 0 oz, 77.56 kg)    Height Measured :   60 in(Converted to: 5 ft 0 in, 152.40 cm)    Body Mass Index :   33.39 kg/m2 (HI)    Body Surface Area :   1.81 m2   Systolic Blood Pressure :   144 mmHg (HI)    Diastolic Blood Pressure :   83 mmHg (HI)    Mean Arterial Pressure :   103 mmHg   Peripheral Pulse Rate :   97 bpm   BP Site :   Right arm   Dorothea Sewell MA - 4/18/2019 4:25 PM CDT   Health Status   Allergies Verified? :   Yes   Medication History Verified? :   Yes   Dorothea Sewell MA - 4/18/2019 4:25 PM CDT   Meds / Allergies   (As Of: 4/18/2019 4:27:42 PM CDT)   Allergies (Active)   adhesive tape  Estimated Onset Date:   Unspecified ; Created By:   Liv Virk; Reaction Status:   Active ; Category:   Drug ; Substance:   adhesive tape ; Type:   Allergy ; Updated By:   Liv Virk; Reviewed Date:   1/16/2018 5:58 PM CST      codeine  Estimated Onset Date:   Unspecified ; Reactions:   Nausea. ; Created By:   Aziza Espinal; Reaction Status:   Active ; Category:   Drug ; Substance:   codeine ; Type:   Allergy ; Updated By:   Aziza Espinal; Reviewed Date:   1/16/2018 5:58 PM CST        Medication List   (As Of: 4/18/2019 4:27:42 PM CDT)   Prescription/Discharge Order    amlodipine-valsartan  :   amlodipine-valsartan ; Status:   Prescribed ; Ordered As Mnemonic:   amlodipine-valsartan 5 mg-160 mg oral tablet ; Simple Display Line:   1 tab(s), Oral, daily, office visit before next refill please, 90 tab(s), 3 Refill(s) ; Ordering Provider:   Yoav Vega MD; Catalog Code:   amlodipine-valsartan ; Order Dt/Tm:   3/28/2019 6:13:53 PM            Home Meds    amoxicillin  :   amoxicillin ; Status:   Documented ; Ordered As Mnemonic:   amoxicillin ; Simple  Display Line:   Oral, tid, for abscess tooth 10/24/18, 0 Refill(s) ; Catalog Code:   amoxicillin ; Order Dt/Tm:   10/24/2018 8:55:00 AM          aspirin  :   aspirin ; Status:   Documented ; Ordered As Mnemonic:   Aspir 81 ; Simple Display Line:   81 mg, po, daily, 0 Refill(s) ; Catalog Code:   aspirin ; Order Dt/Tm:   10/24/2016 5:36:51 PM          calcium carbonate  :   calcium carbonate ; Status:   Documented ; Ordered As Mnemonic:   calcium carbonate ; Simple Display Line:   600 mg, Oral, bid, 0 Refill(s) ; Catalog Code:   calcium carbonate ; Order Dt/Tm:   1/16/2018 5:52:27 PM          cetirizine  :   cetirizine ; Status:   Documented ; Ordered As Mnemonic:   ZyrTEC ; Simple Display Line:   10 mg, Oral, daily, 0 Refill(s) ; Catalog Code:   cetirizine ; Order Dt/Tm:   10/24/2018 8:56:48 AM          cholecalciferol  :   cholecalciferol ; Status:   Documented ; Ordered As Mnemonic:   Vitamin D3 2000 intl units oral tablet ; Simple Display Line:   2,000 International Unit, 1 tab(s), po, daily ; Catalog Code:   cholecalciferol ; Order Dt/Tm:   4/2/2015 6:27:01 PM          Miscellaneous Prescription  :   Miscellaneous Prescription ; Status:   Documented ; Ordered As Mnemonic:   Multivitamin ; Catalog Code:   Miscellaneous Prescription ; Order Dt/Tm:   4/2/2015 6:27:14 PM          Miscellaneous Prescription  :   Miscellaneous Prescription ; Status:   Documented ; Ordered As Mnemonic:   non-medicated sleep aid ; Simple Display Line:   1 tab, Oral, hs, 0 Refill(s) ; Catalog Code:   Miscellaneous Prescription ; Order Dt/Tm:   10/24/2018 8:57:52 AM

## 2022-02-16 NOTE — TELEPHONE ENCOUNTER
Entered by Katarina Desouza CMA on May 01, 2020 11:45:32 AM CDT  Patient due now for 6mth CDM per BRM. RTC placed and one month protocol sent w/ message      ------------------------------------------  From: [facility] #[id number]  To: Yoav Vega MD  Sent: May 1, 2020 11:34:17 AM CDT  Subject: Medication Management  Due: May 2, 2020 11:34:17 AM CDT    ** On Hold Pending Signature **  Drug: metFORMIN (MetFORMIN (Eqv-Glucophage XR) 500 mg oral tablet, extended release)  TAKE 1 TABLET BY MOUTH DAILY WITH SUPPER. INCREASE BY 1 TABLET EACH WEEK TO 4 TABLETS DAILY.  Quantity: 360 tab(s)  Days Supply: 90  Refills: 0  Substitutions Allowed  Notes from Pharmacy:     Dispensed Drug: metFORMIN (MetFORMIN (Eqv-Glucophage XR) 500 mg oral tablet, extended release)  TAKE 1 TABLET BY MOUTH DAILY WITH SUPPER. INCREASE BY 1 TABLET EACH WEEK TO 4 TABLETS DAILY.  Quantity: 360 tab(s)  Days Supply: 90  Refills: 0  Substitutions Allowed  Notes from Pharmacy:   ---------------------------------------------------------------  From: Katarina Desouza CMA   To: [facility] #[id number]    Sent: 5/1/2020 11:46:22 AM CDT  Subject: Medication Management     ** Submitted: **  Order:metFORMIN (MetFORMIN (Eqv-Glucophage XR) 500 mg oral tablet, extended release)  4 tab(s)  Oral  daily  Qty:  120 tab(s)        Refills:  0          Substitutions Allowed     Route To Pharmacy - [facility] #[id number]    Signed by Katarina Desouza CMA  5/1/2020 4:45:00 PM    ** Submitted: **  Complete:metFORMIN (metFORMIN 500 mg oral tablet, extended release)   Signed by Katarina Desouza CMA  5/1/2020 4:46:00 PM    ** Not Approved:  **  metFORMIN (METFORMIN ER 500MG 24HR TABS)  TAKE 1 TABLET BY MOUTH DAILY WITH SUPPER. INCREASE BY 1 TABLET EACH WEEK TO 4 TABLETS DAILY.  Qty:  360 tab(s)        Days Supply:  90        Refills:  0          Substitutions Allowed     Route To Pharmacy - [facility] #[id number]   Signed by Katarina Desouza CMA

## 2022-02-16 NOTE — NURSING NOTE
Depression Screening Entered On:  9/23/2021 6:37 PM CDT    Performed On:  9/23/2021 6:37 PM CDT by Bella Moore CMA               Depression Screening   Little Interest - Pleasure in Activities :   Not at all   Feeling Down, Depressed, Hopeless :   Not at all   Initial Depression Screen Score :   0 Score   Poor Appetite or Overeating :   Not at all   Trouble Falling or Staying Asleep :   Not at all   Feeling Tired or Little Energy :   Not at all   Feeling Bad About Yourself :   Not at all   Trouble Concentrating :   Not at all   Moving or Speaking Slowly :   Not at all   Thoughts Better Off Dead or Hurting Self :   Not at all   Difficulty at Work, Home, Getting Along :   Not difficult at all   Detailed Depression Screen Score :   0    Total Depression Screen Score :   0    Bella Moore CMA - 9/23/2021 6:37 PM CDT

## 2022-02-16 NOTE — TELEPHONE ENCOUNTER
---------------------  From: Nicole May CMA (Phone Messages Pool ([id number]_[facility]))   Sent: 8/3/2020 8:28:54 AM CDT  Subject: Phone Message, Amlodipine.     Phone Message    PCP:   PARUL.      Time of Call:  0814.       Person Calling:  Pt.  Phone number:  [phone].    Returned call at: 0818.    Note:   Appointment scheduled 08/11/2020 with PARUL needs short refill of Amlodipine to make until visit.    Returned call, advised enough tablets of Amlodipine sent to [facility] . She expressed understanding and agreed to keep scheduled visit.    Last office visit and reason:  09/19/2019 DM type 2, HLPD PARUL.

## 2022-02-16 NOTE — NURSING NOTE
Phone Message    PCP: PARUL    Time of call: 6874    Person calling: Jena  Contact # : _    MESSAGE: Pt calls stating that she received results of recent blood work on her phone. Pt is requesting a paper copy also be mailed to [place] [address] [facility].    Last visit/reason: 9/19/19 - DM, HTN, HLD    Labs printed - will have mailed to her home address.

## 2022-02-16 NOTE — PROGRESS NOTES
Patient:   KENDAL HILARIO            MRN: [id number]            FIN: [id number]               Age:   56 years     Sex:  Female     :  1960   Associated Diagnoses:   PMB (postmenopausal bleeding)   Author:   Aguilar Smith MD      Visit Information      Date of Service: 2017 07:49 am  Performing Location: [facility]  Encounter#: [id number]      Primary Care Provider (PCP):  [id number] -UNKNOWN,      Referring Provider:  No referring provider recorded for selected visit.      Chief Complaint   2017 7:52 AM CST    Patient is here for a consult per [facility]BS to discuss PMB.  Patient has history of breast cancer and has been on Tamoxifen for the past 5 years      Interval History   The patient is a 56-year-old female referred by Dr. Monsalve for evaluation of postmenopausal bleeding.  The patient has had some spotting on and off and was evaluated for this about four years ago and at that time had 11 mm endometrial stripe and endometrial biopsy was negative.  She has been on tamoxifen for almost five years and the endometrium was consistent with tamoxifen therapy.  Besides her spotting she has no complaints.  She does not have urinary incontinence or pelvic pain.      Review of Systems   Review  of systems is negative except as documented under interval history.      Health Status   Allergies:    Allergic Reactions (Selected)  Severity Not Documented  Codeine (Nausea.)  Latex (No reactions were documented)   Medications:  (Selected)   Prescriptions  Prescribed  amLODIPine-benazepril 2.5 mg-10 mg oral capsule: 1 cap(s), PO, qhs, # 30 cap(s), 0 Refill(s), Type: Maintenance, Pharmacy: [facility] #[id number], 1 cap(s) po qhs  Documented Medications  Documented  Aspir 81: ( 81 mg ), po, daily, 0 Refill(s), Type: Maintenance  Multivitamin: Multivitamin, Supply, 0 Refill(s), Type: Maintenance  Vitamin D3 2000 intl units oral tablet: 1 tab(s) ( 2,000 International Unit ), po, daily, 0 Refill(s), Type:  Maintenance  tamoxifen 10 mg oral tablet: 1 tab(s) ( 10 mg ), PO, BID, # 60 tab(s), 0 Refill(s), Type: Maintenance   Problem list:    All Problems  Allergic Rhinitis / ICD-9- / Confirmed  Obese / ICD-9-.00 / Probable  Closed fracture of left distal radius and ulna / ICD-9-.44 / Confirmed  Depression, Recurrent / ICD-9-.30 / Confirmed  Breast Cancer / ICD-9-.9 / Confirmed  HTN (Hypertension) / ICD-9-.9 / Confirmed  Spotting between Menses / ICD-9-.6 / Confirmed  Acute Low Back Pain / ICD-9-.2 / Confirmed      Histories   Past Medical History:    Active  Depression, Recurrent (296.30)   Family History:    Diabetes mellitus  Sister  [place] - Cancer of colon  Father ()  Colon polyps.  Aunt     Procedure history:    Colonoscopy ([id number]) on 10/18/2013 at 53 Years.  Comments:  10/30/2013 7:40 AM - Zia Eng MA  Normal colonoscopy, repeat in 5 years due to family hx colon cancer  Right and left skin-sparing mastectomy on 2012 at 51 Years.  [place] lymph node biopsy, right and left axilla on 2012 at 51 Years.  Sphincteroplasty/perineoplasty on 2002 at 41 Years.  Comments:  2012 12:00 PM - Aziza Espinal  [facility]/Chevy Muñoz MD   - Spontaneous vaginal delivery ([id number]).  Comments:  5/10/2011 10:04 AM - Teresa PICKENS, Bella  x 3 (,  and )  Left wrist surgery .   Social History:        Alcohol Assessment            Current, 1-2 times per month, 1 drinks/episode average.      Tobacco Assessment            Never      Substance Abuse Assessment            Never      Employment and Education Assessment            Employed, Work/School description: [employer].      Home and Environment Assessment            Marital status: .  Spouse/Partner name: Bulmaro.  Ale children.      Exercise and Physical Activity Assessment            Exercise frequency: 3-4 times/week.  Exercise type: Walking.      Sexual Assessment             Sexually active: Yes.  Sexual orientation: Heterosexual.        Physical Examination   Vital Signs   1/27/2017 7:52 AM CST Peripheral Pulse Rate 90 bpm    HR Method Electronic    Systolic Blood Pressure 153 mmHg  HI    Diastolic Blood Pressure 78 mmHg    Mean Arterial Pressure 103 mmHg    BP Site Right arm    BP Method Electronic      Gynecologic:  External genitalia, vagina and cervix are normal in appearance.  There is some mild atrophic change.  There is minimal pelvic relaxation.  Ultrasound is done.  Because of the endometrium being slightly thicker, endometrial biopsy was performed.  This was done following ultrasound after betadine prep of cervix and was well tolerated..       Review / Management   Radiology results   Ultrasound, Vaginal probe ultrasound is done.  Ultrasound finds the uterus normal in size.  It measures 8 x 4 x 4 cm.  The endometrium is 12.6 mm diameter and has some hypoechoic and mixed echo area in the endometrium which is consistent with her previous study and consistent with tamoxifen therapy.        Impression and Plan   Diagnosis     PMB (postmenopausal bleeding) ([id number] N95.0).     Postmenopausal bleeding; actually probably from atrophic change.    Endometrial changes related to tamoxifen.  .     Plan:  The patient will be informed of the results of biopsy.  She is nearly with tamoxifen and hopefully this will not be an ongoing issue with her postmenopausal bleeding.  .    Patient Instructions:       Counseled: Patient, Regarding diagnosis, Regarding treatment, Verbalized understanding.

## 2022-02-16 NOTE — LETTER
(Inserted Image. Unable to display)     August 19, 2019      KENDAL HILARIO  [address], [place] [id number]          Dear KENDAL,      Thank you for selecting [facility] (previously [facility]) for your healthcare needs.      Our records indicate you are due for the following services:     Urine Labs ~ Please be prepared to leave a urine specimen for evaluation.    Fasting Lab Tests ~ Please do not eat or drink anything 10 hours prior to your scheduled appointment time.  (Water and any medications that you may need are allowed unless directed otherwise.)    If you had your labs done at another facility or with Direct Access Lab Testing at [facility], please bring in a copy of the results to your next visit, mail a copy, or drop off a copy of your results to your Healthcare Provider.      To schedule an appointment or if you have further questions, please contact your primary clinic:   [facility]       [phone]   [facility]       [phone]              [facility]     [phone]      Powered by [employer]    Sincerely,    Yoav Vega MD, FACP

## 2022-02-16 NOTE — NURSING NOTE
Quick Intake Entered On:  5/30/2019 4:31 PM CDT    Performed On:  5/30/2019 4:31 PM CDT by Alisia Nascimento               Summary   Menstrual Status :   Postmenopausal   Weight Measured :   166.6 lb(Converted to: 166 lb 10 oz, 75.57 kg)    Height Measured :   60 in(Converted to: 5 ft 0 in, 152.40 cm)    Body Mass Index :   32.53 kg/m2 (HI)    Body Surface Area :   1.79 m2   Systolic Blood Pressure :   140 mmHg (HI)    Diastolic Blood Pressure :   79 mmHg   Mean Arterial Pressure :   99 mmHg   Vital Signs Comments :   2nd BP reading    Alisia Nascimento - 5/30/2019 4:31 PM CDT

## 2022-02-16 NOTE — TELEPHONE ENCOUNTER
---------------------  From: Lesa Wheat RN (eRx Pool ([id number]_[facility]))   To: PARUL Message [employer] ([id number]_[place]);     Sent: 7/21/2020 3:25:02 PM CDT  Subject: FW: Medication Management   Due Date/Time: 7/21/2020 11:19:00 AM CDT     Pt requesting refill of Amlodipine-Valsartan. Had labs done 6-26-20. Patient canceled her video visit because she thought  PARUL note on her lab results meant he did not need to see her for appointment. Explained that appointment is usually done after labs to receive refills.  Please advise on appointment and refills of medication.       ------------------------------------------  From: [employer] #[id number]  To: Gaurang Correa MD  Sent: July 20, 2020 11:19:23 AM CDT  Subject: Medication Management  Due: June 24, 2020 10:20:04 PM CDT     ** On Hold Pending Signature **     Dispensed Drug: amlodipine-valsartan (amlodipine-valsartan 5 mg-160 mg oral tablet), TAKE 1 TABLET BY MOUTH DAILY  Quantity: 30 tab(s)  Days Supply: 30  Refills: 0  Substitutions Allowed  Notes from Pharmacy:  ------------------------------------------pt contacted at 1330 and advised appt is needed, agrees to set up appt and was transferred to scheduling for face to face visit---------------------  From: Bella Moore CMA   To: [employer] #[id number]    Sent: 7/22/2020 1:34:52 PM CDT  Subject: FW: Medication Management     ** Not Approved: Patient needs appointment, pt coming in for appt **  amlodipine-valsartan (AMLODIPINE-VALSARTAN 5-160MG TABS)  TAKE 1 TABLET BY MOUTH DAILY  Qty:  30 tab(s)        Days Supply:  30        Refills:  0          Substitutions Allowed     Route To Pharmacy - [facility] #[id number]   Signed by Bella Moore CMA

## 2022-02-16 NOTE — TELEPHONE ENCOUNTER
---------------------  From: Gaurang Correa MD   To: KENDAL HILARIO    Sent: 9/19/2021 1:58:05 PM CDT  Subject: General Message      Labs for your review.  We can discuss in more detail at future clinic visit.       Results:  Date Result Name Ind Value Ref Range   9/18/2021 8:19 AM Cholesterol  141 mg/dL ( - <200)   9/18/2021 8:19 AM HDL ((L)) 45 mg/dL (> OR = 50 - )   9/18/2021 8:19 AM Triglyceride  137 mg/dL ( - <150)   9/18/2021 8:19 AM LDL  74    9/18/2021 8:19 AM Cholesterol/HDL Ratio  3.1 ( - <5.0)   9/18/2021 8:19 AM Non-HDL Cholesterol  96 ( - <130)   9/18/2021 8:19 AM U Creatinine ((L)) 16 mg/dL (20 - 275)   9/18/2021 8:19 AM U Microalbumin  <0.2 mg/dL (See Note: - )   9/18/2021 8:19 AM Ur Microalbumin/Creatinine Ratio  NOTE ( - <30)   9/18/2021 8:19 AM Glucose Level ((H)) 105 mg/dL (65 - 99)   9/18/2021 8:19 AM BUN  11 mg/dL (7 - 25)   9/18/2021 8:19 AM Creatinine Level  0.77 mg/dL (0.50 - 0.99)   9/18/2021 8:19 AM eGFR  83 mL/min/1.73m2 (> OR = 60 - )   9/18/2021 8:19 AM eGFR African American  96 mL/min/1.73m2 (> OR = 60 - )   9/18/2021 8:19 AM BUN/Creat Ratio  NOT APPLICABLE (6 - 22)   9/18/2021 8:19 AM Sodium Level  140 mmol/L (135 - 146)   9/18/2021 8:19 AM Potassium Level  4.4 mmol/L (3.5 - 5.3)   9/18/2021 8:19 AM Chloride Level  104 mmol/L (98 - 110)   9/18/2021 8:19 AM CO2 Level  29 mmol/L (20 - 32)   9/18/2021 8:19 AM Calcium Level  9.8 mg/dL (8.6 - 10.4)   9/18/2021 8:19 AM Hgb A1c ((H)) 6.1 ( - <5.7)

## 2022-02-16 NOTE — TELEPHONE ENCOUNTER
---------------------  From: Coco Meraz RN (Phone Messages Pool ([id number]))   To: PARUL Message Pool ([id number]_[place]);     Sent: 9/20/2021 9:53:03 AM CDT  Subject: lab result concern       PCP:  PARUL     Time of Call:  9:30am       Person Calling:  pt  Phone number:  [phone]    Note: VM received from pt, stating she received lab results and is concerned about creatinine result. Diet consists of a lot of meat. Has an appt with PARUL, will creatinine be rechecked during visit? What does this mean?    Please advise.      Last office visit and reason:  8/11/20 DM, HLPD  DEBIMLM for a return call at 0941  no concerns seen on labs, will discuss in detail at visit coming up  normal  SCr

## 2022-02-16 NOTE — NURSING NOTE
CAGE Assessment Entered On:  8/11/2020 4:38 PM CDT    Performed On:  8/11/2020 4:38 PM CDT by Bella Moore CMA               Assessment   Have you ever felt you should cut down on your drinking :   No   Have people annoyed you by criticizing your drinking :   No   Have you ever felt bad or guilty about your drinking :   No   Have you ever taken a drink first thing in the morning to steady your nerves or get rid of a hangover (Eye-opener) :   No   CAGE Score :   0    Bella Moore CMA - 8/11/2020 4:38 PM CDT

## 2022-02-16 NOTE — PROGRESS NOTES
Patient:   KENDAL HILARIO            MRN: [id number]            FIN: [id number]               Age:   56 years     Sex:  Female     :  1960   Associated Diagnoses:   None   Author:   Jake Monsalve MD      Visit Information      Date of Service: 2017 05:39 pm  Performing Location: [facility]  Encounter#: [id number]      Chief Complaint   2017 5:53 PM CST    routine health maintenance with a follow up to her recent bronchitis        Well Adult History   - would like repeat CXR , there was commnet baout some sort of lesion on last XR. Cough has resolved.  - declines vaccination  - has noted some intermittent spotting over the last few months, sees it on the toilet paper  - overall doing well.       Review of Systems   Constitutional:  Negative except as documented in history of present illness.    Eye:  Negative except as documented in history of present illness.    Respiratory:  Negative except as documented in history of present illness.    Cardiovascular:  Negative except as documented in history of present illness.    Breast:  Negative.    Gastrointestinal:  Negative except as documented in history of present illness.    Genitourinary:  Negative except as documented in history of present illness.    Gynecologic:  Negative except as documented in history of present illness.    Hematology/Lymphatics:  Negative except as documented in history of present illness.    Endocrine:  Negative except as documented in history of present illness.    Immunologic:  Negative except as documented in history of present illness.    Musculoskeletal:  Negative except as documented in history of present illness.    Integumentary:  Negative except as documented in history of present illness.    Neurologic:  Negative except as documented in history of present illness.    Psychiatric:  Negative except as documented in history of present illness.       Health Status   Allergies:    Allergic Reactions  (Selected)  Severity Not Documented  Codeine (Nausea.)  Latex (No reactions were documented)   Medications:  (Selected)   Documented Medications  Documented  Aspir 81: ( 81 mg ), po, daily, 0 Refill(s), Type: Maintenance  Multivitamin: Multivitamin, Supply, 0 Refill(s), Type: Maintenance  Vitamin D3 2000 intl units oral tablet: 1 tab(s) ( 2,000 International Unit ), po, daily, 0 Refill(s), Type: Maintenance  tamoxifen 10 mg oral tablet: 1 tab(s) ( 10 mg ), PO, BID, # 60 tab(s), 0 Refill(s), Type: Maintenance   Problem list:    All Problems  Acute Low Back Pain / ICD-9-.2 / Confirmed  Allergic Rhinitis / ICD-9- / Confirmed  Breast Cancer / ICD-9-.9 / Confirmed  Closed fracture of left distal radius and ulna / ICD-9-.44 / Confirmed  Depression, Recurrent / ICD-9-.30 / Confirmed  HTN (Hypertension) / ICD-9-.9 / Confirmed  Obese / ICD-9-.00 / Probable  Spotting between Menses / ICD-9-.6 / Confirmed      Histories   Family History:    Diabetes mellitus  Sister  [place] - Cancer of colon  Father ()  Colon polyps.  Aunt     Procedure history:    Colonoscopy ([id number]) on 10/18/2013 at 53 Years.  Comments:  10/30/2013 7:40 AM - Zia Eng MA  Normal colonoscopy, repeat in 5 years due to family hx colon cancer  Right and left skin-sparing mastectomy on 2012 at 51 Years.  [place] lymph node biopsy, right and left axilla on 2012 at 51 Years.  Sphincteroplasty/perineoplasty on 2002 at 41 Years.  Comments:  2012 12:00 PM - Aziza Espinal  [facility]/Chevy Muñoz MD   - Spontaneous vaginal delivery ([id number]).  Comments:  5/10/2011 10:04 AM - Teresa PICKENS, Bella  x 3 (,  and )  Left wrist surgery .      Physical Examination   Vital Signs   2017 5:53 PM CST Temperature Tympanic 98.3 DegF    Peripheral Pulse Rate 76 bpm    Pulse Site Radial artery    HR Method Manual    Systolic Blood Pressure 172 mmHg  HI    Diastolic Blood Pressure  88 mmHg    Mean Arterial Pressure 116 mmHg    BP Site Right arm    BP Method Manual    BP Systolic Repeat 174 mmHg    BP Diastolic Repeat 100 mmHg    BP Site Repeat Right arm      Measurements from flowsheet : Measurements   1/16/2017 5:53 PM CST Height Measured - Standard 60 in    Weight Measured - Standard 179 lb    BSA 1.85 m2    Body Mass Index 34.95 kg/m2      General:  Alert and oriented, No acute distress.    Eye:  Pupils are equal, round and reactive to light, Extraocular movements are intact.    HENT:  Normocephalic, Normal hearing, Oral mucosa is moist.    Neck:  Supple, No lymphadenopathy.    Respiratory:  Lungs are clear to auscultation, Respirations are non-labored, Breath sounds are equal, Symmetrical chest wall expansion.    Cardiovascular:  Normal rate, Regular rhythm, No murmur, No gallop.    Breast:  deferred, pt w history of radical masectomy b/l.    Gastrointestinal:  Soft, Non-tender.    Pelvic - somewhat atrophic vaginal tissue. Blood at cervical os. Normal bimanual exa   Musculoskeletal:  Normal gait.    Integumentary:  Warm, Dry.    Neurologic:  No focal deficits, Cranial Nerves II-XII are grossly intact, Normal deep tendon reflexes.    Psychiatric:  Cooperative, Appropriate mood & affect.       Impression and Plan   Breast cancer - pt has been on tamoxifen for almost 5 yrs. Has f/u appointment with onc in a couple of months, will discuss discontinuation at that time  Postmenopausal bleeding - will obtain pelvic US to look at endometrial stripe. May need endometrial biopsy depending on result  HTN - obtain BMP, UA, TSH, will likely start lisinopril +/- amlodipine depending on these results. Will also check lipids. She is fasting today.   Cervical cancers creening - pap smear done today.  Declines immunization  CRC screening up to date.

## 2022-02-16 NOTE — NURSING NOTE
Comprehensive Intake Entered On:  9/23/2021 5:15 PM CDT    Performed On:  9/23/2021 5:11 PM CDT by Bella Moore CMA               Summary   Chief Complaint :   f/u chronic    Menstrual Status :   Postmenopausal   Weight Measured :   155.2 lb(Converted to: 155 lb 3 oz, 70.398 kg)    Height Measured :   60 in(Converted to: 5 ft 0 in, 152.40 cm)    Body Mass Index :   30.31 kg/m2 (HI)    Body Surface Area :   1.72 m2   Systolic Blood Pressure :   128 mmHg   Diastolic Blood Pressure :   84 mmHg (HI)    Mean Arterial Pressure :   99 mmHg   Peripheral Pulse Rate :   118 bpm (HI)    Temperature Tympanic :   98.6 DegF(Converted to: 37.0 DegC)    Oxygen Saturation :   97 %   Bella Moore CMA - 9/23/2021 5:11 PM CDT   Health Status   Allergies Verified? :   Yes   Medication History Verified? :   Yes   Medical History Verified? :   Yes   Pre-Visit Planning Status :   Completed   Tobacco Use? :   Never smoker   Bella Moore CMA - 9/23/2021 5:11 PM CDT   Social History   Social History   (As Of: 9/23/2021 5:15:40 PM CDT)   Alcohol:        Current, 1-2 times per month, 1 drinks/episode average.   (Last Updated: 5/10/2011 10:06:08 AM CDT by Bella Moore CMA)          Tobacco:        Never   (Last Updated: 5/10/2011 10:06:08 AM CDT by Bella Moore CMA)   Never (less than 100 in lifetime)   (Last Updated: 9/23/2021 5:12:28 PM CDT by Bella Moore CMA)          Electronic Cigarette/Vaping:        Electronic Cigarette Use: Never.   (Last Updated: 9/23/2021 5:12:33 PM CDT by Bella Moore CMA)          Substance Abuse:        Never   (Last Updated: 5/10/2011 10:06:09 AM CDT by Bella Moore CMA)          Employment/School:        Employed, Work/School description: Home .   (Last Updated: 11/12/2018 7:28:11 AM CST by Leigh Ann Brown)          Home/Environment:        Marital status: .  Spouse/Partner name: Aubree Aguilera children.   (Last Updated: 8/6/2013 3:28:22 PM CDT by Vijaya Turcios CMA)          Nutrition/Health:         Type of diet: Regular.   (Last Updated: 11/12/2018 7:28:18 AM CST by Leigh Ann Brown)          Exercise:        Exercise frequency: 3-4 times/week.  Exercise type: Walking.   (Last Updated: 8/6/2013 3:28:07 PM CDT by Vijaya Turcios CMA)          Sexual:        Sexually active: No.  Identifies as female, Sexual orientation: Straight or heterosexual.   (Last Updated: 11/12/2018 7:28:35 AM CST by Leigh Ann Brown)

## 2022-02-16 NOTE — NURSING NOTE
Comprehensive Intake Entered On:  8/11/2020 1:44 PM CDT    Performed On:  8/11/2020 1:39 PM CDT by Bella Moore CMA               Summary   Chief Complaint :   f/u chronic    Menstrual Status :   Postmenopausal   Weight Measured :   160 lb(Converted to: 160 lb 0 oz, 72.57 kg)    Height Measured :   60 in(Converted to: 5 ft 0 in, 152.40 cm)    Body Mass Index :   31.24 kg/m2 (HI)    Body Surface Area :   1.75 m2   Systolic Blood Pressure :   136 mmHg (HI)    Diastolic Blood Pressure :   64 mmHg   Mean Arterial Pressure :   88 mmHg   Peripheral Pulse Rate :   84 bpm   Temperature Tympanic :   98.8 DegF(Converted to: 37.1 DegC)    Bella Moore CMA - 8/11/2020 1:39 PM CDT   Health Status   Allergies Verified? :   Yes   Medication History Verified? :   Yes   Medical History Verified? :   Yes   Pre-Visit Planning Status :   Completed   Tobacco Use? :   Never smoker   Bella Moore CMA - 8/11/2020 1:39 PM CDT   ID Risk Screen   Recent Travel History :   No recent travel   Family Member Travel History :   No recent travel   Other Exposure to Infectious Disease :   Unknown   Bella Moore CMA - 8/11/2020 1:39 PM CDT   Social History   Social History   (As Of: 8/11/2020 1:44:00 PM CDT)   Alcohol:        Current, 1-2 times per month, 1 drinks/episode average.   (Last Updated: 5/10/2011 10:06:08 AM CDT by Bella Moore CMA)          Tobacco:        Never   (Last Updated: 5/10/2011 10:06:08 AM CDT by Bella Moore CMA)          Substance Abuse:        Never   (Last Updated: 5/10/2011 10:06:09 AM CDT by Bella Moore CMA)          Employment/School:        Employed, Work/School description: Home .   (Last Updated: 11/12/2018 7:28:11 AM CST by Leigh Ann Brown)          Home/Environment:        Marital status: .  Spouse/Partner name: Bulmaro.  Ale children.   (Last Updated: 8/6/2013 3:28:22 PM CDT by Vijaya Turcios CMA)          Nutrition/Health:        Type of diet: Regular.   (Last Updated: 11/12/2018 7:28:18 AM CST  by Leigh Ann Brown)          Exercise:        Exercise frequency: 3-4 times/week.  Exercise type: Walking.   (Last Updated: 8/6/2013 3:28:07 PM CDT by Vijaya Turcios CMA)          Sexual:        Sexually active: No.  Identifies as female, Sexual orientation: Straight or heterosexual.   (Last Updated: 11/12/2018 7:28:35 AM CST by Leigh Ann Brown)

## 2022-02-16 NOTE — TELEPHONE ENCOUNTER
---------------------  From: Melinda Summers (Phone Messages Pool ([id number]_[facility]))   Sent: 2/7/2019 1:46:25 PM CST  Subject: Medication refill      Phone Message    PCP ANGEL/PARUL      Time of Call  1:33      Person Calling _ Pt  Phone number [phone]      Returned call at _1:43    Note  _ Pt left message stating that she had to cancel her appt today due to weather, and will RS, but needs a refill on her BP medication.  Called to pt to find out what pharmacy she wants medication sent to?  Pt states [facility] in [place].  Writer sent refill to [facility] per protocol.    Last office visit and reason _10/24/18 GYN visit

## 2022-02-16 NOTE — NURSING NOTE
Quick Intake Entered On:  6/26/2020 8:58 AM CDT    Performed On:  6/26/2020 8:57 AM CDT by Katarina Desouza CMA               Summary   Chief Complaint :   CSS vital prior to lab   Menstrual Status :   Postmenopausal   Systolic Blood Pressure :   146 mmHg (HI)    Diastolic Blood Pressure :   84 mmHg   Mean Arterial Pressure :   105 mmHg   Peripheral Pulse Rate :   125 bpm (HI)    BP Site :   Right arm   BP Method :   Electronic   Katarina Desouza CMA - 6/26/2020 8:57 AM CDT   More Vitals   Systolic Blood Pressure Repeat :   140 mmHg   Diastolic Blood Pressure Repeat :   74 mmHg   BP Site Repeat :   Right arm   Katarina Desouza CMA - 6/26/2020 8:57 AM CDT

## 2022-02-16 NOTE — NURSING NOTE
Patient was called at 4:30pm and notified of benign endometrial bx results from last week.  Per TAW no follow up needed unless any new or worsening symptoms.  Patient stated understanding.

## 2022-02-16 NOTE — LETTER
(Inserted Image. Unable to display)     July 12, 2019      KENDAL HILARIO  [address], [place] [id number]          Dear KENDAL,      Thank you for selecting [facility] (previously [facility]) for your healthcare needs.    Your Healthcare Provider has recommended that you schedule a diabetes management appointment with our Certified Diabetes Educator, Alisia Nascimento RD, CD, CDE.     Please bring your glucose meter and your blood glucose diary to your appointment.    Available services include:  - Education about diabetes with guidance and support   - Education on the 7 Self Care Behaviors for Diabetes Management:     Healthy Eating   portion control, label readings, carbohydrate recommendations, heart healthy guidelines, meal planning    Being Active - Physical activity guidelines     Monitoring   blood glucose targets, evaluation of blood glucose readings and lab results    Taking Medication   medication management    Problem Solving   discuss any concerns/ questions     Healthy Coping   disease progression and management    Reducing Risks   prevention strategies to help avoid potential complications related to uncontrolled diabetes  - Weight loss strategies    To schedule an appointment or if you have further questions, please contact your primary clinic:   [facility]       [phone]   [facility]       [phone]              [facility]     [phone]      Powered by [employer] and [employer]    Sincerely,    Providers at [facility]

## 2022-02-28 DIAGNOSIS — I10 BENIGN ESSENTIAL HYPERTENSION: Primary | ICD-10-CM

## 2022-03-01 RX ORDER — AMLODIPINE AND VALSARTAN 5; 160 MG/1; MG/1
1 TABLET ORAL DAILY
Qty: 30 TABLET | Refills: 0 | Status: SHIPPED | OUTPATIENT
Start: 2022-03-01 | End: 2022-04-21

## 2022-03-01 NOTE — TELEPHONE ENCOUNTER
Last Written Prescription Date:  9/23/21  Last Fill Quantity: 90,  # refills: 1   Last office visit: 9/23/21 with prescribing provider:  PARUL   Future Office Visit:  none

## 2022-04-20 DIAGNOSIS — I10 BENIGN ESSENTIAL HYPERTENSION: ICD-10-CM

## 2022-04-21 RX ORDER — AMLODIPINE AND VALSARTAN 5; 160 MG/1; MG/1
1 TABLET ORAL DAILY
Qty: 30 TABLET | Refills: 0 | Status: SHIPPED | OUTPATIENT
Start: 2022-04-21 | End: 2022-05-20

## 2022-04-21 NOTE — TELEPHONE ENCOUNTER
Routing refill request to provider for review/approval because:  Reanna given x1 and patient did not follow up, please advise    Last Written Prescription Date:  3/1/2022  Last Fill Quantity: 30,  # refills:    Last office visit: 9/23/21 with prescribing provider:  PARUL   Future Office Visit:  none    Carmen Loredo RN  [facility]

## 2022-04-22 DIAGNOSIS — E78.5 DYSLIPIDEMIA: Primary | ICD-10-CM

## 2022-04-25 RX ORDER — ATORVASTATIN CALCIUM 20 MG/1
20 TABLET, FILM COATED ORAL DAILY
Qty: 90 TABLET | Refills: 0 | Status: SHIPPED | OUTPATIENT
Start: 2022-04-25 | End: 2022-08-05

## 2022-05-19 DIAGNOSIS — I10 BENIGN ESSENTIAL HYPERTENSION: ICD-10-CM

## 2022-05-20 RX ORDER — AMLODIPINE AND VALSARTAN 5; 160 MG/1; MG/1
1 TABLET ORAL DAILY
Qty: 30 TABLET | Refills: 4 | Status: SHIPPED | OUTPATIENT
Start: 2022-05-20 | End: 2022-09-29

## 2022-05-20 NOTE — TELEPHONE ENCOUNTER
"Prescription approved per [facility] Refill Protocol.    Last Written Prescription Date:  9/23/2021  Last Fill Quantity: 90,  # refills: 1   Last office visit provider:  9/23/2021     Requested Prescriptions   Pending Prescriptions Disp Refills     amLODIPine-valsartan (EXFORGE) 5-160 MG tablet [Pharmacy Med Name: AMLODIPINE-VALSARTAN 5-160MG TABS] 30 tablet 0     Sig: TAKE 1 TABLET BY MOUTH DAILY       Angiotensin-II Receptors Failed - 5/19/2022  8:29 PM        Failed - Recent (12 mo) or future (30 days) visit within the authorizing provider's specialty     Patient has had an office visit with the authorizing provider or a provider within the authorizing providers department within the previous 12 mos or has a future within next 30 days. See \"Patient Info\" tab in inbasket, or \"Choose Columns\" in Meds & Orders section of the refill encounter.      Passed 9/23/2021        Failed - Normal serum creatinine on file in past 12 months     No lab results found.    Ok to refill medication if creatinine is low  Passed 9/18/2021        Failed - Normal serum potassium on file in past 12 months     No lab results found.         Passed 9/18/2021        Passed - Last blood pressure under 140/90 in past 12 months     BP Readings from Last 3 Encounters:   09/23/21 118/78   08/11/20 136/64   06/26/20 (!) 140/74                 Passed - Medication is active on med list        Passed - Patient is age 18 or older        Passed - No active pregnancy on record        Passed - No positive pregnancy test in past 12 months       Calcium Channel Blockers Protocol  Failed - 5/19/2022  8:29 PM        Failed - Recent (12 mo) or future (30 days) visit within the authorizing provider's specialty     Patient has had an office visit with the authorizing provider or a provider within the authorizing providers department within the previous 12 mos or has a future within next 30 days. See \"Patient Info\" tab in inbasket, or \"Choose Columns\" in Meds & Orders " section of the refill encounter.      Passed 9/23/2021        Failed - Normal serum creatinine on file in past 12 months     No lab results found.    Ok to refill medication if creatinine is low  Passed 9/18/2021        Passed - Blood pressure under 140/90 in past 12 months     BP Readings from Last 3 Encounters:   09/23/21 118/78   08/11/20 136/64   06/26/20 (!) 140/74                 Passed - Medication is active on med list        Passed - Patient is age 18 or older        Passed - No active pregnancy on record        Passed - No positive pregnancy test in past 12 months             Aniya Loredo RN 05/20/22 8:57 AM

## 2022-06-22 DIAGNOSIS — E78.5 DYSLIPIDEMIA: ICD-10-CM

## 2022-06-22 RX ORDER — ATORVASTATIN CALCIUM 20 MG/1
TABLET, FILM COATED ORAL
Qty: 90 TABLET | Refills: 0 | OUTPATIENT
Start: 2022-06-22

## 2022-08-05 DIAGNOSIS — E78.5 DYSLIPIDEMIA: ICD-10-CM

## 2022-08-05 RX ORDER — ATORVASTATIN CALCIUM 20 MG/1
20 TABLET, FILM COATED ORAL DAILY
Qty: 90 TABLET | Refills: 0 | Status: SHIPPED | OUTPATIENT
Start: 2022-08-05 | End: 2022-09-29

## 2022-08-05 NOTE — TELEPHONE ENCOUNTER
Reason for Call:  Medication or medication refill:    Do you use a [facility]?  Name of the pharmacy and phone number for the current request:  [facility]    Name of the medication requested: atorvasatin 20mg tab    Other request: PATIENT IS SCHEDULED FOR PX/MED CHECK ON 9.29.22 WITH [facility]. PER INSURANCE PATIENT CAN NOT SCHEDULE ANY SOONER DUE TO INSURANCE.  Patient will need enough of her script to last until patient's appointment.  She only has a few tabs left.    Can we leave a detailed message on this number? YES    Phone number patient can be reached at: Home number on file [phone] (home)    Best Time: anytime    Call taken on 8/5/2022 at 10:07 AM by NED LLANES

## 2022-09-26 DIAGNOSIS — Z85.3 PERSONAL HISTORY OF MALIGNANT NEOPLASM OF BREAST: ICD-10-CM

## 2022-09-26 DIAGNOSIS — Z00.00 HEALTH CARE MAINTENANCE: ICD-10-CM

## 2022-09-26 PROBLEM — C50.919 MALIGNANT NEOPLASM OF BREAST (H): Status: ACTIVE | Noted: 2022-09-26

## 2022-09-26 PROBLEM — I10 HYPERTENSION: Status: ACTIVE | Noted: 2022-09-26

## 2022-09-26 PROBLEM — E66.9 OBESITY: Status: ACTIVE | Noted: 2022-09-26

## 2022-09-26 PROBLEM — E11.9 TYPE 2 DIABETES MELLITUS (H): Status: ACTIVE | Noted: 2022-09-26

## 2022-09-26 PROBLEM — E78.5 DYSLIPIDEMIA: Status: ACTIVE | Noted: 2022-09-26

## 2022-09-26 PROBLEM — F33.9 RECURRENT DEPRESSIVE DISORDER (H): Status: ACTIVE | Noted: 2022-09-26

## 2022-09-26 PROBLEM — J30.9 ALLERGIC RHINITIS: Status: ACTIVE | Noted: 2022-09-26

## 2022-09-26 PROBLEM — C50.919 MALIGNANT NEOPLASM OF BREAST (H): Status: RESOLVED | Noted: 2022-09-26 | Resolved: 2022-09-26

## 2022-09-26 RX ORDER — LORATADINE 10 MG/1
10 TABLET ORAL DAILY
COMMUNITY
Start: 2021-09-23 | End: 2024-09-26

## 2022-09-26 RX ORDER — AMPICILLIN TRIHYDRATE 250 MG
1000 CAPSULE ORAL 2 TIMES DAILY
COMMUNITY
Start: 2021-09-23

## 2022-09-29 ENCOUNTER — OFFICE VISIT (OUTPATIENT)
Dept: FAMILY MEDICINE | Facility: CLINIC | Age: 62
End: 2022-09-29
Payer: COMMERCIAL

## 2022-09-29 VITALS
SYSTOLIC BLOOD PRESSURE: 140 MMHG | WEIGHT: 153 LBS | HEIGHT: 59 IN | HEART RATE: 92 BPM | BODY MASS INDEX: 30.84 KG/M2 | DIASTOLIC BLOOD PRESSURE: 82 MMHG | TEMPERATURE: 98.6 F

## 2022-09-29 DIAGNOSIS — E78.5 DYSLIPIDEMIA: Primary | ICD-10-CM

## 2022-09-29 DIAGNOSIS — Z85.3 PERSONAL HISTORY OF MALIGNANT NEOPLASM OF BREAST: ICD-10-CM

## 2022-09-29 DIAGNOSIS — Z00.00 HEALTH CARE MAINTENANCE: ICD-10-CM

## 2022-09-29 DIAGNOSIS — I10 BENIGN ESSENTIAL HYPERTENSION: ICD-10-CM

## 2022-09-29 DIAGNOSIS — E11.9 TYPE 2 DIABETES MELLITUS WITHOUT COMPLICATION, WITHOUT LONG-TERM CURRENT USE OF INSULIN (H): ICD-10-CM

## 2022-09-29 DIAGNOSIS — I10 PRIMARY HYPERTENSION: ICD-10-CM

## 2022-09-29 DIAGNOSIS — Z23 NEED FOR VACCINATION: ICD-10-CM

## 2022-09-29 DIAGNOSIS — E78.5 DYSLIPIDEMIA: ICD-10-CM

## 2022-09-29 LAB — HBA1C MFR BLD: 6.2 % (ref 0–5.6)

## 2022-09-29 PROCEDURE — 99396 PREV VISIT EST AGE 40-64: CPT | Mod: 25 | Performed by: INTERNAL MEDICINE

## 2022-09-29 PROCEDURE — 90471 IMMUNIZATION ADMIN: CPT | Performed by: INTERNAL MEDICINE

## 2022-09-29 PROCEDURE — 83036 HEMOGLOBIN GLYCOSYLATED A1C: CPT | Performed by: INTERNAL MEDICINE

## 2022-09-29 PROCEDURE — 0134A COVID-19,PF,MODERNA BIVALENT: CPT | Performed by: INTERNAL MEDICINE

## 2022-09-29 PROCEDURE — 82043 UR ALBUMIN QUANTITATIVE: CPT | Performed by: INTERNAL MEDICINE

## 2022-09-29 PROCEDURE — 90682 RIV4 VACC RECOMBINANT DNA IM: CPT | Performed by: INTERNAL MEDICINE

## 2022-09-29 PROCEDURE — 80061 LIPID PANEL: CPT | Performed by: INTERNAL MEDICINE

## 2022-09-29 PROCEDURE — 36415 COLL VENOUS BLD VENIPUNCTURE: CPT | Performed by: INTERNAL MEDICINE

## 2022-09-29 PROCEDURE — 91313 COVID-19,PF,MODERNA BIVALENT: CPT | Performed by: INTERNAL MEDICINE

## 2022-09-29 PROCEDURE — 99214 OFFICE O/P EST MOD 30 MIN: CPT | Mod: 25 | Performed by: INTERNAL MEDICINE

## 2022-09-29 PROCEDURE — 80048 BASIC METABOLIC PNL TOTAL CA: CPT | Performed by: INTERNAL MEDICINE

## 2022-09-29 RX ORDER — ASPIRIN 81 MG/1
81 TABLET, CHEWABLE ORAL DAILY
COMMUNITY

## 2022-09-29 RX ORDER — ATORVASTATIN CALCIUM 20 MG/1
TABLET, FILM COATED ORAL
Qty: 90 TABLET | Refills: 1 | Status: SHIPPED | OUTPATIENT
Start: 2022-09-29 | End: 2022-09-29

## 2022-09-29 RX ORDER — PHENOL 1.4 %
1 AEROSOL, SPRAY (ML) MUCOUS MEMBRANE 2 TIMES DAILY
COMMUNITY

## 2022-09-29 RX ORDER — AMLODIPINE AND VALSARTAN 5; 160 MG/1; MG/1
1 TABLET ORAL DAILY
Qty: 90 TABLET | Refills: 3 | Status: SHIPPED | OUTPATIENT
Start: 2022-09-29 | End: 2023-09-28

## 2022-09-29 RX ORDER — MULTIVITAMIN
1 TABLET ORAL DAILY
COMMUNITY

## 2022-09-29 RX ORDER — ATORVASTATIN CALCIUM 20 MG/1
20 TABLET, FILM COATED ORAL DAILY
Qty: 90 TABLET | Refills: 3 | Status: SHIPPED | OUTPATIENT
Start: 2022-09-29 | End: 2023-09-28

## 2022-09-29 RX ORDER — METFORMIN HCL 500 MG
500 TABLET, EXTENDED RELEASE 24 HR ORAL 2 TIMES DAILY
Qty: 180 TABLET | Refills: 3 | Status: SHIPPED | OUTPATIENT
Start: 2022-09-29 | End: 2023-09-28

## 2022-09-29 RX ORDER — METFORMIN HCL 500 MG
500 TABLET, EXTENDED RELEASE 24 HR ORAL 2 TIMES DAILY
COMMUNITY
End: 2022-09-29

## 2022-09-29 ASSESSMENT — ENCOUNTER SYMPTOMS
ABDOMINAL PAIN: 0
CHILLS: 0
HEMATOCHEZIA: 0
HEMATURIA: 0

## 2022-09-29 NOTE — TELEPHONE ENCOUNTER
Prescription approved per [facility] Refill Protocol.  appt and labs will be conducted today  Last Written Prescription Date: 8/5/22  Last Fill Quantity: 90,  # refills: 0   Last office visit: 9/23/21   Future Office Visit:   Next 5 appointments (look out 90 days)    Sep 29, 2022  5:00 PM  (Arrive by 4:40 PM)  Adult Preventative Visit with Easton Correa MD  [facility] ([facility] ) [address] [place] [id number] [phone]

## 2022-09-29 NOTE — PROGRESS NOTES
SUBJECTIVE:   CC: Jena is an 62 year old who presents for preventive health visit.  Doing well overall.  No specific complaints or concerns.  Tolerating medications well.  Longstanding history of whitecoat hypertension.  Does monitor blood pressures at home and has correlated blood pressure cuff in clinic before.  Typically with good home control.  History of prior breast implants and has been told she is reaching timely expiration.  Looking for guidance on future replacement      Patient has been advised of split billing requirements and indicates understanding: Yes     Healthy Habits:     Getting at least 3 servings of Calcium per day:  Yes    Bi-annual eye exam:  NO    Dental care twice a year:  Yes    Sleep apnea or symptoms of sleep apnea:  None    Diet:  Diabetic    Frequency of exercise:  2-3 days/week    Duration of exercise:  15-30 minutes    Taking medications regularly:  Yes    PHQ-2 Total Score: 0    Additional concerns today:  No          Today's PHQ-2 Score:   PHQ-2 ( 1999 Pfizer) 9/29/2022   Q1: Little interest or pleasure in doing things 0   Q2: Feeling down, depressed or hopeless 0   PHQ-2 Score 0   Q1: Little interest or pleasure in doing things Not at all   Q2: Feeling down, depressed or hopeless Not at all   PHQ-2 Score 0       Abuse: Current or Past (Physical, Sexual or Emotional) - No  Do you feel safe in your environment? Yes    Have you ever done Advance Care Planning? (For example, a Health Directive, POLST, or a discussion with a medical provider or your loved ones about your wishes): Yes, patient states has an Advance Care Planning document and will bring a copy to the clinic.    Social History     Tobacco Use     Smoking status: Never Smoker     Smokeless tobacco: Never Used   Substance Use Topics     Alcohol use: Not Currently     If you drink alcohol do you typically have >3 drinks per day or >7 drinks per week? No    Alcohol Use 9/29/2022   Prescreen: >3 drinks/day or >7  "drinks/week? No         Breast Cancer Screening:    FHS-7:   Breast CA Risk Assessment (FHS-7) 9/29/2022   Did any of your first-degree relatives have breast or ovarian cancer? No   Did any of your relatives have bilateral breast cancer? No   Did any man in your family have breast cancer? No   Did any woman in your family have breast and ovarian cancer? Yes   Did any woman in your family have breast cancer before age 50 y? No   Do you have 2 or more relatives with breast and/or ovarian cancer? No   Do you have 2 or more relatives with breast and/or bowel cancer? No     Reviewed and updated as needed this visit by clinical staff   Tobacco  Allergies  Meds                Reviewed and updated as needed this visit by Provider                       Review of Systems   Constitutional: Negative for chills.   HENT: Negative for congestion.    Cardiovascular: Negative for chest pain.   Gastrointestinal: Negative for abdominal pain and hematochezia.   Genitourinary: Negative for hematuria.        OBJECTIVE:   BP (!) 150/84 (BP Location: Right arm, Patient Position: Sitting, Cuff Size: Adult Large)   Pulse 92   Temp 98.6  F (37  C) (Temporal)   Ht 1.499 m (4' 11\")   Wt 69.4 kg (153 lb)   BMI 30.90 kg/m    Physical Exam  GENERAL: healthy, alert and no distress  NECK: no adenopathy, no asymmetry, masses, or scars and thyroid normal to palpation  RESP: lungs clear to auscultation - no rales, rhonchi or wheezes  CV: regular rate and rhythm, normal S1 S2, no S3 or S4, no murmur, click or rub, no peripheral edema and peripheral pulses strong  ABDOMEN: soft, nontender, no hepatosplenomegaly, no masses and bowel sounds normal  MS: no gross musculoskeletal defects noted, no edema    Diagnostic Test Results:  Labs reviewed in Epic  Results for orders placed or performed in visit on 09/29/22 (from the past 24 hour(s))   Hemoglobin A1c   Result Value Ref Range    Hemoglobin A1C 6.2 (H) 0.0 - 5.6 %       ASSESSMENT/PLAN: "     Problem List Items Addressed This Visit        Endocrine    Dyslipidemia - Primary    Relevant Medications    atorvastatin (LIPITOR) 20 MG tablet    Other Relevant Orders    Lipid panel reflex to direct LDL Fasting    Type 2 diabetes mellitus without complication, without long-term current use of insulin (H)     controlled  hypoglycemic medications: metformin ER 1000mg daily  insulin therapy:  last HbA1c: 6.2%   microvascular complications: none known  macrovascular complications: none known  ASA/KAVYA/statin:  ASA 81mg daily, atorvastatin 20mg qhs           Relevant Medications    metFORMIN (GLUCOPHAGE XR) 500 MG 24 hr tablet    Other Relevant Orders    Hemoglobin A1c (Completed)    Basic metabolic panel  (Ca, Cl, CO2, Creat, Gluc, K, Na, BUN)    Albumin Random Urine Quantitative with Creat Ratio       Circulatory    Primary hypertension     Controlled (significant whitecoat component)  current antihypertensive regimen: amlodipine/valsartan 5/160mg daily   regimen changes:  intolerance:  future titration/work-up plan:            Relevant Medications    amLODIPine-valsartan (EXFORGE) 5-160 MG tablet       Other    Personal history of malignant neoplasm of breast     Recommending follow-up with plastic surgery regarding planned replacement of expiring implants           Health care maintenance     - colonoscopy 10/2020 (family h/o CRC); reassess at age 66  - h/o bilateral mastectomy  - last PAP 10/2018  - completed COVID vaccination, Bivalent booster today  - influenza today             Other Visit Diagnoses     Need for vaccination        Relevant Orders    INFLUENZA QUAD, PF (RIV4) (FLUBLOK) (Completed)    COVID-19,PF,MODERNA BIVALENT 18+Yrs (Completed)    Benign essential hypertension        Relevant Medications    amLODIPine-valsartan (EXFORGE) 5-160 MG tablet              COUNSELING:  Reviewed preventive health counseling, as reflected in patient instructions       Regular exercise        "Immunizations    Vaccinated for: Influenza, COVID Bivalent           Osteoporosis prevention/bone health       Colorectal Cancer Screening    Estimated body mass index is 30.9 kg/m  as calculated from the following:    Height as of this encounter: 1.499 m (4' 11\").    Weight as of this encounter: 69.4 kg (153 lb).    Weight management plan: Discussed healthy diet and exercise guidelines    She reports that she has never smoked. She has never used smokeless tobacco.      Counseling Resources:  ATP IV Guidelines  Pooled Cohorts Equation Calculator  Breast Cancer Risk Calculator  BRCA-Related Cancer Risk Assessment: FHS-7 Tool  FRAX Risk Assessment  ICSI Preventive Guidelines  Dietary Guidelines for Americans, 2010  USDA's MyPlate  ASA Prophylaxis  Lung CA Screening    Easton Correa MD  [facility]  "

## 2022-09-30 LAB
ANION GAP SERPL CALCULATED.3IONS-SCNC: 12 MMOL/L (ref 7–15)
BUN SERPL-MCNC: 13.5 MG/DL (ref 8–23)
CALCIUM SERPL-MCNC: 9.5 MG/DL (ref 8.8–10.2)
CHLORIDE SERPL-SCNC: 103 MMOL/L (ref 98–107)
CHOLEST SERPL-MCNC: 142 MG/DL
CREAT SERPL-MCNC: 0.72 MG/DL (ref 0.51–0.95)
CREAT UR-MCNC: 78.4 MG/DL
DEPRECATED HCO3 PLAS-SCNC: 25 MMOL/L (ref 22–29)
GFR SERPL CREATININE-BSD FRML MDRD: >90 ML/MIN/1.73M2
GLUCOSE SERPL-MCNC: 98 MG/DL (ref 70–99)
HDLC SERPL-MCNC: 48 MG/DL
LDLC SERPL CALC-MCNC: 75 MG/DL
MICROALBUMIN UR-MCNC: <12 MG/L
MICROALBUMIN/CREAT UR: NORMAL MG/G{CREAT}
NONHDLC SERPL-MCNC: 94 MG/DL
POTASSIUM SERPL-SCNC: 3.8 MMOL/L (ref 3.4–5.3)
SODIUM SERPL-SCNC: 140 MMOL/L (ref 136–145)
TRIGL SERPL-MCNC: 95 MG/DL

## 2022-09-30 NOTE — ASSESSMENT & PLAN NOTE
controlled  hypoglycemic medications: metformin ER 1000mg daily  insulin therapy:  last HbA1c: 6.2%   microvascular complications: none known  macrovascular complications: none known  ASA/KAVYA/statin:  ASA 81mg daily, atorvastatin 20mg qhs

## 2022-09-30 NOTE — ASSESSMENT & PLAN NOTE
- colonoscopy 10/2020 (family h/o CRC); reassess at age 66  - h/o bilateral mastectomy  - last PAP 10/2018  - completed COVID vaccination, Bivalent booster today  - influenza today

## 2022-09-30 NOTE — ASSESSMENT & PLAN NOTE
Controlled (significant whitecoat component)  current antihypertensive regimen: amlodipine/valsartan 5/160mg daily   regimen changes:  intolerance:  future titration/work-up plan:

## 2023-02-12 ENCOUNTER — HEALTH MAINTENANCE LETTER (OUTPATIENT)
Age: 63
End: 2023-02-12

## 2023-05-20 ENCOUNTER — HEALTH MAINTENANCE LETTER (OUTPATIENT)
Age: 63
End: 2023-05-20

## 2023-08-12 ENCOUNTER — OFFICE VISIT (OUTPATIENT)
Dept: URGENT CARE | Facility: URGENT CARE | Age: 63
End: 2023-08-12
Payer: COMMERCIAL

## 2023-08-12 VITALS
DIASTOLIC BLOOD PRESSURE: 84 MMHG | RESPIRATION RATE: 18 BRPM | BODY MASS INDEX: 31.69 KG/M2 | TEMPERATURE: 98.8 F | OXYGEN SATURATION: 98 % | HEART RATE: 114 BPM | SYSTOLIC BLOOD PRESSURE: 152 MMHG | WEIGHT: 156.9 LBS

## 2023-08-12 DIAGNOSIS — R39.9 UTI SYMPTOMS: ICD-10-CM

## 2023-08-12 DIAGNOSIS — N94.89 VAGINAL BURNING: ICD-10-CM

## 2023-08-12 DIAGNOSIS — N81.11 MIDLINE CYSTOCELE: Primary | ICD-10-CM

## 2023-08-12 DIAGNOSIS — N81.89 PELVIC FLOOR WEAKNESS IN FEMALE: ICD-10-CM

## 2023-08-12 LAB
ALBUMIN UR-MCNC: NEGATIVE MG/DL
APPEARANCE UR: CLEAR
BILIRUB UR QL STRIP: NEGATIVE
CLUE CELLS: ABNORMAL
COLOR UR AUTO: YELLOW
GLUCOSE UR STRIP-MCNC: NEGATIVE MG/DL
HGB UR QL STRIP: NEGATIVE
KETONES UR STRIP-MCNC: NEGATIVE MG/DL
LEUKOCYTE ESTERASE UR QL STRIP: NEGATIVE
NITRATE UR QL: NEGATIVE
PH UR STRIP: 7 [PH] (ref 5–7)
SP GR UR STRIP: 1.01 (ref 1–1.03)
TRICHOMONAS, WET PREP: ABNORMAL
UROBILINOGEN UR STRIP-ACNC: 0.2 E.U./DL
WBC'S/HIGH POWER FIELD, WET PREP: ABNORMAL
YEAST, WET PREP: ABNORMAL

## 2023-08-12 PROCEDURE — 81003 URINALYSIS AUTO W/O SCOPE: CPT | Performed by: PHYSICIAN ASSISTANT

## 2023-08-12 PROCEDURE — 99204 OFFICE O/P NEW MOD 45 MIN: CPT | Performed by: PHYSICIAN ASSISTANT

## 2023-08-12 PROCEDURE — 87210 SMEAR WET MOUNT SALINE/INK: CPT | Performed by: PHYSICIAN ASSISTANT

## 2023-08-12 RX ORDER — LACTOBACILLUS RHAMNOSUS GG 10B CELL
1 CAPSULE ORAL 2 TIMES DAILY
COMMUNITY

## 2023-08-12 NOTE — PROGRESS NOTES
"Patient presents with:  UTI: Bladder pressure, pain worse when bowels or bladder is full x 2 weeks worse as day goes on      Clinical Decision Making:  I had a long conversation with the patient regarding her pelvic exam.  I did not notice pelvic prolapse but was concerned for a cystocele which may be causing her symptoms.  We had a long conversation regarding the pathophysiology of pelvic floor dysfunction, cystocele.  Patient education handouts were given for the patient and referral to OB/GYN for definitive evaluation and treatment of her symptoms.    30 min spent on the date of the encounter in chart review, patient visit, review of tests, documentation and/ordiscussion with other providers about the issues documented above.        ICD-10-CM    1. Midline cystocele  N81.11       2. Pelvic floor weakness in female  N81.89       3. Vaginal burning  N94.9 Wet prep - Clinic Collect     Ob/Gyn Referral      4. UTI symptoms  R39.9 UA Macroscopic with reflex to Microscopic and Culture - Lab Collect     UA Macroscopic with reflex to Microscopic and Culture - Lab Collect     Ob/Gyn Referral          Patient Instructions   There may be pelvic floor weakening and dysfunction of the pelvic floor or cystocele.  Follow-up with OB/GYN for further evaluation and treatment    HPI:  Jena Valencia is a 63 year old female who presents today for feeling of fullness in the pelvic and vaginal region.  She shares that she has been bending over and straining with lifting over the weekend while working in her yard and on her fence.  She had to do heavy lifting.  Patient was concerned that she may have a \"uterine prolapse\" as it feels that there is fullness into the pelvic region and into the vagina.  The symptoms are made worse when the patient has to urinate, has constipation as a large bowel movement that she has to forcefully push out or when she coughs sneezes or has to lift heavy objects.  She also shares that she recently had a " cold with frequent coughing over the last month.  Currently the patient is not having urinary voiding symptoms to include hesitancy urgency frequency or dysuria no gross hematuria no fever chills or night sweats and no flank or back pain.  No reported vaginal discharge burning or vulvar vaginal itching or redness.  Patient shares she has had 3 children with spontaneous vaginal delivery.  Has had an episiotomy that went through the perineum to the anus and has had a reconstruction after the episiotomy.    History obtained from chart review and the patient.    Problem List:  2022-09: Allergic rhinitis  2022-09: Dyslipidemia  2022-09: Primary hypertension  2022-09: Malignant neoplasm of breast (H)  2022-09: Obesity  2022-09: Recurrent depressive disorder (H)  2022-09: Type 2 diabetes mellitus without complication, without long-  term current use of insulin (H)  2022-09: Personal history of malignant neoplasm of breast  2022-09: Health care maintenance      Past Medical History:   Diagnosis Date    Malignant neoplasm of breast (H) 9/26/2022       Social History     Tobacco Use    Smoking status: Never    Smokeless tobacco: Never   Substance Use Topics    Alcohol use: Not Currently       Review of Systems  As above in HPI otherwise negative.    Vitals:    08/12/23 0926   BP: (!) 152/84   Pulse: 114   Resp: 18   Temp: 98.8  F (37.1  C)   TempSrc: Tympanic   SpO2: 98%   Weight: 71.2 kg (156 lb 14.4 oz)       General: Patient is resting comfortably no acute distress is afebrile  HEENT: Head is normocephalic atraumatic   eyes are PERRL EOMI sclera anicteric   Skin: Without rash non-diaphoretic  :  Supervised chaperoned exam with Melinda mercado nurse in the office today, pelvic exam showed that on the superior portion of the vaginal vault there was protrusion which would be consistent with a cystocele  The pelvic floor did not appear to be protruded and the cervix was on the posterior aspect of the vaginal vault   Is no  tenderness with evaluation of the cervix with cervical motion and examination.     Physical Exam      Labs:  Results for orders placed or performed in visit on 08/12/23   UA Macroscopic with reflex to Microscopic and Culture - Lab Collect     Status: Normal    Specimen: Urine, Clean Catch   Result Value Ref Range    Color Urine Yellow Colorless, Straw, Light Yellow, Yellow    Appearance Urine Clear Clear    Glucose Urine Negative Negative mg/dL    Bilirubin Urine Negative Negative    Ketones Urine Negative Negative mg/dL    Specific Gravity Urine 1.010 1.003 - 1.035    Blood Urine Negative Negative    pH Urine 7.0 5.0 - 7.0    Protein Albumin Urine Negative Negative mg/dL    Urobilinogen Urine 0.2 0.2, 1.0 E.U./dL    Nitrite Urine Negative Negative    Leukocyte Esterase Urine Negative Negative    Narrative    Microscopic not indicated   Wet prep - Clinic Collect     Status: Abnormal    Specimen: Vagina; Swab   Result Value Ref Range    Trichomonas Absent Absent    Yeast Absent Absent    Clue Cells Absent Absent    WBCs/high power field 3+ (A) None       At the end of the encounter, I discussed results, diagnosis, medications. Discussed red flags for immediate return to clinic/ER, as well as indications for follow up if no improvement. Patient understood and agreed to plan. Patient was stable for discharge.

## 2023-08-12 NOTE — PATIENT INSTRUCTIONS
There may be pelvic floor weakening and dysfunction of the pelvic floor or cystocele.  Follow-up with OB/GYN for further evaluation and treatment

## 2023-09-05 ENCOUNTER — TRANSFERRED RECORDS (OUTPATIENT)
Dept: HEALTH INFORMATION MANAGEMENT | Facility: CLINIC | Age: 63
End: 2023-09-05
Payer: COMMERCIAL

## 2023-09-21 ENCOUNTER — LAB (OUTPATIENT)
Dept: LAB | Facility: CLINIC | Age: 63
End: 2023-09-21
Payer: COMMERCIAL

## 2023-09-21 DIAGNOSIS — I10 PRIMARY HYPERTENSION: ICD-10-CM

## 2023-09-21 DIAGNOSIS — E11.9 TYPE 2 DIABETES MELLITUS WITHOUT COMPLICATION, WITHOUT LONG-TERM CURRENT USE OF INSULIN (H): Primary | ICD-10-CM

## 2023-09-21 LAB
CREAT UR-MCNC: 32.2 MG/DL
HBA1C MFR BLD: 6.4 % (ref 0–5.6)
MICROALBUMIN UR-MCNC: <12 MG/L
MICROALBUMIN/CREAT UR: NORMAL MG/G{CREAT}

## 2023-09-21 PROCEDURE — 36415 COLL VENOUS BLD VENIPUNCTURE: CPT

## 2023-09-21 PROCEDURE — 82570 ASSAY OF URINE CREATININE: CPT

## 2023-09-21 PROCEDURE — 80048 BASIC METABOLIC PNL TOTAL CA: CPT

## 2023-09-21 PROCEDURE — 80061 LIPID PANEL: CPT

## 2023-09-21 PROCEDURE — 82043 UR ALBUMIN QUANTITATIVE: CPT

## 2023-09-21 PROCEDURE — 83036 HEMOGLOBIN GLYCOSYLATED A1C: CPT

## 2023-09-22 LAB
ANION GAP SERPL CALCULATED.3IONS-SCNC: 15 MMOL/L (ref 7–15)
BUN SERPL-MCNC: 12.9 MG/DL (ref 8–23)
CALCIUM SERPL-MCNC: 10.3 MG/DL (ref 8.8–10.2)
CHLORIDE SERPL-SCNC: 103 MMOL/L (ref 98–107)
CHOLEST SERPL-MCNC: 148 MG/DL
CREAT SERPL-MCNC: 0.71 MG/DL (ref 0.51–0.95)
DEPRECATED HCO3 PLAS-SCNC: 22 MMOL/L (ref 22–29)
EGFRCR SERPLBLD CKD-EPI 2021: >90 ML/MIN/1.73M2
GLUCOSE SERPL-MCNC: 100 MG/DL (ref 70–99)
HDLC SERPL-MCNC: 50 MG/DL
LDLC SERPL CALC-MCNC: 76 MG/DL
NONHDLC SERPL-MCNC: 98 MG/DL
POTASSIUM SERPL-SCNC: 4.3 MMOL/L (ref 3.4–5.3)
SODIUM SERPL-SCNC: 140 MMOL/L (ref 136–145)
TRIGL SERPL-MCNC: 109 MG/DL

## 2023-09-28 ENCOUNTER — OFFICE VISIT (OUTPATIENT)
Dept: FAMILY MEDICINE | Facility: CLINIC | Age: 63
End: 2023-09-28
Payer: COMMERCIAL

## 2023-09-28 VITALS
RESPIRATION RATE: 19 BRPM | HEART RATE: 110 BPM | OXYGEN SATURATION: 99 % | SYSTOLIC BLOOD PRESSURE: 175 MMHG | BODY MASS INDEX: 31.25 KG/M2 | WEIGHT: 155 LBS | TEMPERATURE: 98.1 F | DIASTOLIC BLOOD PRESSURE: 76 MMHG | HEIGHT: 59 IN

## 2023-09-28 DIAGNOSIS — Z23 NEED FOR VACCINATION: ICD-10-CM

## 2023-09-28 DIAGNOSIS — Z00.00 HEALTH CARE MAINTENANCE: Primary | ICD-10-CM

## 2023-09-28 DIAGNOSIS — F33.9 RECURRENT DEPRESSIVE DISORDER (H): ICD-10-CM

## 2023-09-28 DIAGNOSIS — I10 BENIGN ESSENTIAL HYPERTENSION: ICD-10-CM

## 2023-09-28 DIAGNOSIS — E11.9 TYPE 2 DIABETES MELLITUS WITHOUT COMPLICATION, WITHOUT LONG-TERM CURRENT USE OF INSULIN (H): ICD-10-CM

## 2023-09-28 DIAGNOSIS — E78.5 DYSLIPIDEMIA: ICD-10-CM

## 2023-09-28 PROCEDURE — 99396 PREV VISIT EST AGE 40-64: CPT | Mod: 25 | Performed by: INTERNAL MEDICINE

## 2023-09-28 PROCEDURE — 90471 IMMUNIZATION ADMIN: CPT | Performed by: INTERNAL MEDICINE

## 2023-09-28 PROCEDURE — 90682 RIV4 VACC RECOMBINANT DNA IM: CPT | Performed by: INTERNAL MEDICINE

## 2023-09-28 RX ORDER — ATORVASTATIN CALCIUM 20 MG/1
20 TABLET, FILM COATED ORAL DAILY
Qty: 90 TABLET | Refills: 3 | Status: SHIPPED | OUTPATIENT
Start: 2023-09-28 | End: 2024-08-20

## 2023-09-28 RX ORDER — METFORMIN HCL 500 MG
500 TABLET, EXTENDED RELEASE 24 HR ORAL 2 TIMES DAILY
Qty: 180 TABLET | Refills: 3 | Status: SHIPPED | OUTPATIENT
Start: 2023-09-28 | End: 2024-08-20

## 2023-09-28 RX ORDER — AMLODIPINE AND VALSARTAN 5; 160 MG/1; MG/1
1 TABLET ORAL DAILY
Qty: 90 TABLET | Refills: 3 | Status: SHIPPED | OUTPATIENT
Start: 2023-09-28 | End: 2024-08-20

## 2023-09-28 ASSESSMENT — ENCOUNTER SYMPTOMS
HEMATOCHEZIA: 0
HEADACHES: 0
HEMATURIA: 0
CONSTIPATION: 0
COUGH: 0
ABDOMINAL PAIN: 0
NERVOUS/ANXIOUS: 0
DYSURIA: 0
DIZZINESS: 0
BREAST MASS: 0
CHILLS: 0
PALPITATIONS: 0
FEVER: 0
HEARTBURN: 0
JOINT SWELLING: 0
ARTHRALGIAS: 0
FREQUENCY: 0
WEAKNESS: 0
SHORTNESS OF BREATH: 0
MYALGIAS: 0
SORE THROAT: 0
DIARRHEA: 0
PARESTHESIAS: 0
EYE PAIN: 0
NAUSEA: 0

## 2023-09-28 ASSESSMENT — PATIENT HEALTH QUESTIONNAIRE - PHQ9
SUM OF ALL RESPONSES TO PHQ QUESTIONS 1-9: 0
SUM OF ALL RESPONSES TO PHQ QUESTIONS 1-9: 0
10. IF YOU CHECKED OFF ANY PROBLEMS, HOW DIFFICULT HAVE THESE PROBLEMS MADE IT FOR YOU TO DO YOUR WORK, TAKE CARE OF THINGS AT HOME, OR GET ALONG WITH OTHER PEOPLE: NOT DIFFICULT AT ALL

## 2023-09-28 NOTE — PROGRESS NOTES
SUBJECTIVE:   CC: Jena is an 63 year old who presents for preventive health visit. Patient has been seeing OB for pelvic floor insufficiency and notes no current issues at this time. Patient notes that her blood pressure monitoring at home is much lower than here, has previously had her monitor checked in clinic. Reports BP at the OB was 118/90.  Denies fever, unplanned weight loss/gain, nausea, vomiting, diarrhea, blood in stool/urine, chest pain, edema, dizziness, and headaches.        9/28/2023     5:25 PM   Additional Questions   Roomed by Katarina JOSUE       Healthy Habits:     Getting at least 3 servings of Calcium per day:  Yes    Bi-annual eye exam:  Yes    Dental care twice a year:  Yes    Sleep apnea or symptoms of sleep apnea:  None    Diet:  Diabetic    Frequency of exercise:  2-3 days/week    Duration of exercise:  Less than 15 minutes    Taking medications regularly:  Yes    Medication side effects:  None    Additional concerns today:  No      Today's PHQ-9 Score:       9/28/2023     5:12 PM   PHQ-9 SCORE   PHQ-9 Total Score MyChart 0   PHQ-9 Total Score 0       Social History     Tobacco Use    Smoking status: Never    Smokeless tobacco: Never   Substance Use Topics    Alcohol use: Not Currently             9/28/2023     5:15 PM   Alcohol Use   Prescreen: >3 drinks/day or >7 drinks/week? Not Applicable     Reviewed orders with patient.  Reviewed health maintenance and updated orders accordingly - Yes    Breast Cancer Screening:    FHS-7:       9/29/2022     4:50 PM 9/28/2023     5:16 PM   Breast CA Risk Assessment (FHS-7)   Did any of your first-degree relatives have breast or ovarian cancer? No No   Did any of your relatives have bilateral breast cancer? No No   Did any man in your family have breast cancer? No No   Did any woman in your family have breast and ovarian cancer? Yes No   Did any woman in your family have breast cancer before age 50 y? No No   Do you have 2 or more relatives with  "breast and/or ovarian cancer? No No   Do you have 2 or more relatives with breast and/or bowel cancer? No No     Mammogram Screening: Recommended mammography every 1-2 years with patient discussion and risk factor consideration  Pertinent mammograms are reviewed under the imaging tab.    History of abnormal Pap smear: NO - age 30-65 PAP every 5 years with negative HPV co-testing recommended  Needs PAP done this year. Encouraged to see OB for this.      Reviewed and updated as needed this visit by clinical staff   Tobacco  Allergies  Meds              Reviewed and updated as needed this visit by Provider                 Past Medical History:   Diagnosis Date    Malignant neoplasm of breast (H) 9/26/2022        Review of Systems   Constitutional:  Negative for chills and fever.   HENT:  Negative for congestion, ear pain, hearing loss and sore throat.    Eyes:  Negative for pain and visual disturbance.   Respiratory:  Negative for cough and shortness of breath.    Cardiovascular:  Negative for chest pain, palpitations and peripheral edema.   Gastrointestinal:  Negative for abdominal pain, constipation, diarrhea, heartburn, hematochezia and nausea.   Breasts:  Negative for tenderness, breast mass and discharge.   Genitourinary:  Negative for dysuria, frequency, genital sores, hematuria, pelvic pain, urgency, vaginal bleeding and vaginal discharge.   Musculoskeletal:  Negative for arthralgias, joint swelling and myalgias.   Skin:  Negative for rash.   Neurological:  Negative for dizziness, weakness, headaches and paresthesias.   Psychiatric/Behavioral:  Negative for mood changes. The patient is not nervous/anxious.          OBJECTIVE:   BP (!) 181/82   Pulse 110   Temp 98.1  F (36.7  C)   Resp 19   Ht 1.499 m (4' 11\")   Wt 70.3 kg (155 lb)   LMP  (Approximate)   SpO2 99%   BMI 31.31 kg/m    Physical Exam  GENERAL APPEARANCE: healthy, alert and no distress  EYES: Eyes grossly normal to inspection, PERRL and " conjunctivae and sclerae normal  HENT: ear canals and TM's normal, nose and mouth without ulcers or lesions, oropharynx clear and oral mucous membranes moist  RESP: lungs clear to auscultation - no rales, rhonchi or wheezes  CV: regular rate and rhythm, normal S1 S2, no S3 or S4, no murmur, click or rub, no peripheral edema and peripheral pulses strong  ABDOMEN: soft, nontender, no hepatosplenomegaly, no masses and bowel sounds normal  MS: no musculoskeletal defects are noted and gait is age appropriate without ataxia  SKIN: no suspicious lesions or rashes  NEURO: Normal strength and tone, sensory exam grossly normal, mentation intact and speech normal  PSYCH: mentation appears normal and affect normal/bright    ASSESSMENT/PLAN:   (Z00.00) Health care maintenance  (primary encounter diagnosis)  Comment: Patient is over all in good health.   Plan: REVIEW OF HEALTH MAINTENANCE PROTOCOL ORDERS        - Encouraged patient to schedule PAP with OB this year.     (I10) Benign essential hypertension  Comment: Blood pressure high today on two different measurements.   Plan: amLODIPine-valsartan (EXFORGE) 5-160 MG tablet  - Encouraged patient to bring in home monitor to see trends at home.  - Did have normotensive BP reading at OB two weeks ago.     (E78.5) Dyslipidemia  Plan: atorvastatin (LIPITOR) 20 MG tablet        - Continue on atorvastatin. Labs WNL.     (E11.9) Type 2 diabetes mellitus without complication, without long-term current use of insulin (H)  Comment: Well managed.   Plan: metFORMIN (GLUCOPHAGE XR) 500 MG 24 hr tablet  - Continue on metformin.  - A1C 6.4 and fasting glucose 100 on 9/21/23    (Z23) Need for vaccination  Comment: Compliant  Plan: INFLUENZA VACCINE 18-64Y (FLUBLOK)   - Receiving vaccine today.   - Will get COVID-19 vaccine when available.     (F33.9) Recurrent depressive disorder (H)  Comment: No current concerns.   Plan: Will continue conversations with patient about need and wants to treat.  "    Patient has been advised of split billing requirements and indicates understanding: Yes      COUNSELING:  Reviewed preventive health counseling, as reflected in patient instructions      BMI:   Estimated body mass index is 31.31 kg/m  as calculated from the following:    Height as of this encounter: 1.499 m (4' 11\").    Weight as of this encounter: 70.3 kg (155 lb).   Weight management plan: Discussed healthy diet and exercise guidelines      She reports that she has never smoked. She has never used smokeless tobacco.            Annie Han DNP student on 9/28/2023 at 6:34 PM    Pt seen, examined, and assessed with student.  Agree with above assessment/plan.      Easton Correa MD  [facility] HospitalAnswers submitted by the patient for this visit:  Patient Health Questionnaire (Submitted on 9/28/2023)  If you checked off any problems, how difficult have these problems made it for you to do your work, take care of things at home, or get along with other people?: Not difficult at all  PHQ9 TOTAL SCORE: 0    "

## 2023-12-31 ENCOUNTER — HEALTH MAINTENANCE LETTER (OUTPATIENT)
Age: 63
End: 2023-12-31

## 2024-03-24 ENCOUNTER — OFFICE VISIT (OUTPATIENT)
Dept: URGENT CARE | Facility: URGENT CARE | Age: 64
End: 2024-03-24
Payer: COMMERCIAL

## 2024-03-24 VITALS
DIASTOLIC BLOOD PRESSURE: 84 MMHG | SYSTOLIC BLOOD PRESSURE: 136 MMHG | OXYGEN SATURATION: 99 % | WEIGHT: 159.8 LBS | RESPIRATION RATE: 16 BRPM | BODY MASS INDEX: 32.28 KG/M2 | TEMPERATURE: 98.3 F | HEART RATE: 104 BPM

## 2024-03-24 DIAGNOSIS — H10.9 CONJUNCTIVITIS OF BOTH EYES, UNSPECIFIED CONJUNCTIVITIS TYPE: Primary | ICD-10-CM

## 2024-03-24 PROCEDURE — 99213 OFFICE O/P EST LOW 20 MIN: CPT

## 2024-03-24 RX ORDER — TOBRAMYCIN AND DEXAMETHASONE 3; 1 MG/ML; MG/ML
1-2 SUSPENSION/ DROPS OPHTHALMIC 4 TIMES DAILY
Qty: 5 ML | Refills: 1 | Status: SHIPPED | OUTPATIENT
Start: 2024-03-24 | End: 2024-03-29

## 2024-03-24 NOTE — PROGRESS NOTES
"  Assessment & Plan     Conjunctivitis of both eyes, unspecified conjunctivitis type    - tobramycin-dexAMETHasone (TOBRADEX) 0.3-0.1 % ophthalmic suspension; Place 1-2 drops into both eyes 4 times daily for 5 days        BMI  Estimated body mass index is 32.28 kg/m  as calculated from the following:    Height as of 9/28/23: 1.499 m (4' 11\").    Weight as of this encounter: 72.5 kg (159 lb 12.8 oz).             No follow-ups on file.    Lavinia Bella is a 63 year old, presenting for the following health issues:  Eye Problem (Bilateral eye redness, discharge, and itching x 1 day. Did have an exposure 1 week ago to pink eye.)        9/28/2023     5:25 PM   Additional Questions   Roomed by Katarina SIMMS     Patient here with bilateral eye mattering and redness. Began yesterday. 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 No other systemic symptoms. No vision loss or eye pain.           Review of Systems  Constitutional, HEENT, cardiovascular, pulmonary, gi and gu systems are negative, except as otherwise noted.      Objective    /84 (BP Location: Right arm, Patient Position: Sitting, Cuff Size: Adult Large)   Pulse 104   Temp 98.3  F (36.8  C) (Oral)   Resp 16   Wt 72.5 kg (159 lb 12.8 oz)   SpO2 99%   BMI 32.28 kg/m    Body mass index is 32.28 kg/m .  Physical Exam   GENERAL: alert and no distress  EYES: Eyes grossly normal to inspection and injected conjunctivae   NECK: no adenopathy, no asymmetry, masses, or scars  RESP: lungs clear to auscultation - no rales, rhonchi or wheezes  MS: no gross musculoskeletal defects noted, no edema    No results found for any visits on 03/24/24.        Signed Electronically by: [facility]    "

## 2024-05-18 ENCOUNTER — HEALTH MAINTENANCE LETTER (OUTPATIENT)
Age: 64
End: 2024-05-18

## 2024-08-07 ENCOUNTER — TELEPHONE (OUTPATIENT)
Dept: FAMILY MEDICINE | Facility: CLINIC | Age: 64
End: 2024-08-07
Payer: MEDICARE

## 2024-08-07 DIAGNOSIS — I10 PRIMARY HYPERTENSION: ICD-10-CM

## 2024-08-07 DIAGNOSIS — E78.5 DYSLIPIDEMIA: ICD-10-CM

## 2024-08-07 DIAGNOSIS — E11.9 TYPE 2 DIABETES MELLITUS WITHOUT COMPLICATION, WITHOUT LONG-TERM CURRENT USE OF INSULIN (H): Primary | ICD-10-CM

## 2024-08-07 NOTE — TELEPHONE ENCOUNTER
Order/Referral Request    Who is requesting: Jena    Orders being requested: Annual lab work  Scheduled for Welcome to Medicare visit on 9/26, and annual lab work prior on Sat 9/21/2024      When are orders needed by: before 9/21/2024    Has this been discussed with Provider: Yes    Does patient have a preference on a Group/Provider/Facility? [id number]    Does patient have an appointment scheduled?: Yes: 9/21/2024    Where to send orders: Place orders within Epic    Could we send this information to you in [email or web address] or would you prefer to receive a phone call?:   Not needed

## 2024-08-15 ENCOUNTER — MYC REFILL (OUTPATIENT)
Dept: FAMILY MEDICINE | Facility: CLINIC | Age: 64
End: 2024-08-15
Payer: MEDICARE

## 2024-08-15 DIAGNOSIS — I10 BENIGN ESSENTIAL HYPERTENSION: ICD-10-CM

## 2024-08-15 DIAGNOSIS — E11.9 TYPE 2 DIABETES MELLITUS WITHOUT COMPLICATION, WITHOUT LONG-TERM CURRENT USE OF INSULIN (H): ICD-10-CM

## 2024-08-15 DIAGNOSIS — E78.5 DYSLIPIDEMIA: ICD-10-CM

## 2024-08-15 RX ORDER — AMLODIPINE AND VALSARTAN 5; 160 MG/1; MG/1
1 TABLET ORAL DAILY
Qty: 90 TABLET | Refills: 3 | Status: CANCELLED | OUTPATIENT
Start: 2024-08-15

## 2024-08-15 RX ORDER — METFORMIN HCL 500 MG
500 TABLET, EXTENDED RELEASE 24 HR ORAL 2 TIMES DAILY
Qty: 180 TABLET | Refills: 3 | Status: CANCELLED | OUTPATIENT
Start: 2024-08-15

## 2024-08-15 RX ORDER — ATORVASTATIN CALCIUM 20 MG/1
20 TABLET, FILM COATED ORAL DAILY
Qty: 90 TABLET | Refills: 3 | Status: CANCELLED | OUTPATIENT
Start: 2024-08-15

## 2024-08-20 ENCOUNTER — TELEPHONE (OUTPATIENT)
Dept: FAMILY MEDICINE | Facility: CLINIC | Age: 64
End: 2024-08-20
Payer: MEDICARE

## 2024-08-20 DIAGNOSIS — E11.9 TYPE 2 DIABETES MELLITUS WITHOUT COMPLICATION, WITHOUT LONG-TERM CURRENT USE OF INSULIN (H): ICD-10-CM

## 2024-08-20 DIAGNOSIS — I10 BENIGN ESSENTIAL HYPERTENSION: ICD-10-CM

## 2024-08-20 DIAGNOSIS — E78.5 DYSLIPIDEMIA: ICD-10-CM

## 2024-08-20 RX ORDER — ATORVASTATIN CALCIUM 20 MG/1
20 TABLET, FILM COATED ORAL DAILY
Qty: 60 TABLET | Refills: 0 | Status: SHIPPED | OUTPATIENT
Start: 2024-08-20 | End: 2024-09-26

## 2024-08-20 RX ORDER — AMLODIPINE AND VALSARTAN 5; 160 MG/1; MG/1
1 TABLET ORAL DAILY
Qty: 60 TABLET | Refills: 0 | Status: SHIPPED | OUTPATIENT
Start: 2024-08-20 | End: 2024-09-26

## 2024-08-20 RX ORDER — METFORMIN HCL 500 MG
500 TABLET, EXTENDED RELEASE 24 HR ORAL 2 TIMES DAILY
Qty: 120 TABLET | Refills: 0 | Status: SHIPPED | OUTPATIENT
Start: 2024-08-20 | End: 2024-09-26

## 2024-08-20 NOTE — TELEPHONE ENCOUNTER
[facility] is reported No Refills left.  Pt is running low on medication.      AWV:  9/26/2024 5:30 PM (Arrive by 5:10 PM)     Verbal Order/ Dr. Gaurang Correa.   OK to send RX to cover.

## 2024-09-21 ENCOUNTER — LAB (OUTPATIENT)
Dept: LAB | Facility: CLINIC | Age: 64
End: 2024-09-21
Payer: COMMERCIAL

## 2024-09-21 DIAGNOSIS — E11.9 TYPE 2 DIABETES MELLITUS WITHOUT COMPLICATION, WITHOUT LONG-TERM CURRENT USE OF INSULIN (H): ICD-10-CM

## 2024-09-21 LAB
ANION GAP SERPL CALCULATED.3IONS-SCNC: 10 MMOL/L (ref 7–15)
BUN SERPL-MCNC: 8.4 MG/DL (ref 8–23)
CALCIUM SERPL-MCNC: 9.7 MG/DL (ref 8.8–10.4)
CHLORIDE SERPL-SCNC: 104 MMOL/L (ref 98–107)
CHOLEST SERPL-MCNC: 146 MG/DL
CREAT SERPL-MCNC: 0.7 MG/DL (ref 0.51–0.95)
CREAT UR-MCNC: 33.4 MG/DL
EGFRCR SERPLBLD CKD-EPI 2021: >90 ML/MIN/1.73M2
EST. AVERAGE GLUCOSE BLD GHB EST-MCNC: 134 MG/DL
FASTING STATUS PATIENT QL REPORTED: NO
FASTING STATUS PATIENT QL REPORTED: NO
GLUCOSE SERPL-MCNC: 115 MG/DL (ref 70–99)
HBA1C MFR BLD: 6.3 % (ref 0–5.6)
HCO3 SERPL-SCNC: 27 MMOL/L (ref 22–29)
HDLC SERPL-MCNC: 56 MG/DL
LDLC SERPL CALC-MCNC: 72 MG/DL
MICROALBUMIN UR-MCNC: <12 MG/L
MICROALBUMIN/CREAT UR: NORMAL MG/G{CREAT}
NONHDLC SERPL-MCNC: 90 MG/DL
POTASSIUM SERPL-SCNC: 4.2 MMOL/L (ref 3.4–5.3)
SODIUM SERPL-SCNC: 141 MMOL/L (ref 135–145)
TRIGL SERPL-MCNC: 89 MG/DL

## 2024-09-21 PROCEDURE — 82043 UR ALBUMIN QUANTITATIVE: CPT

## 2024-09-21 PROCEDURE — 80061 LIPID PANEL: CPT

## 2024-09-21 PROCEDURE — 82570 ASSAY OF URINE CREATININE: CPT

## 2024-09-21 PROCEDURE — 83036 HEMOGLOBIN GLYCOSYLATED A1C: CPT

## 2024-09-21 PROCEDURE — 36415 COLL VENOUS BLD VENIPUNCTURE: CPT

## 2024-09-21 PROCEDURE — 80048 BASIC METABOLIC PNL TOTAL CA: CPT

## 2024-09-26 ENCOUNTER — OFFICE VISIT (OUTPATIENT)
Dept: FAMILY MEDICINE | Facility: CLINIC | Age: 64
End: 2024-09-26
Payer: MEDICARE

## 2024-09-26 VITALS
TEMPERATURE: 97.7 F | HEART RATE: 79 BPM | HEIGHT: 59 IN | BODY MASS INDEX: 30.24 KG/M2 | WEIGHT: 150 LBS | SYSTOLIC BLOOD PRESSURE: 143 MMHG | OXYGEN SATURATION: 96 % | DIASTOLIC BLOOD PRESSURE: 82 MMHG | RESPIRATION RATE: 16 BRPM

## 2024-09-26 DIAGNOSIS — Z13.820 SCREENING FOR OSTEOPOROSIS: ICD-10-CM

## 2024-09-26 DIAGNOSIS — E11.9 TYPE 2 DIABETES MELLITUS WITHOUT COMPLICATION, WITHOUT LONG-TERM CURRENT USE OF INSULIN (H): ICD-10-CM

## 2024-09-26 DIAGNOSIS — Z85.3 PERSONAL HISTORY OF MALIGNANT NEOPLASM OF BREAST: ICD-10-CM

## 2024-09-26 DIAGNOSIS — Z23 NEED FOR VACCINATION: Primary | ICD-10-CM

## 2024-09-26 DIAGNOSIS — Z00.00 HEALTH CARE MAINTENANCE: ICD-10-CM

## 2024-09-26 DIAGNOSIS — Z78.0 ASYMPTOMATIC MENOPAUSAL STATE: ICD-10-CM

## 2024-09-26 DIAGNOSIS — I10 PRIMARY HYPERTENSION: ICD-10-CM

## 2024-09-26 DIAGNOSIS — I10 BENIGN ESSENTIAL HYPERTENSION: ICD-10-CM

## 2024-09-26 DIAGNOSIS — F33.9 RECURRENT DEPRESSIVE DISORDER (H): ICD-10-CM

## 2024-09-26 DIAGNOSIS — E78.5 DYSLIPIDEMIA: ICD-10-CM

## 2024-09-26 PROCEDURE — 90677 PCV20 VACCINE IM: CPT | Performed by: INTERNAL MEDICINE

## 2024-09-26 PROCEDURE — G0402 INITIAL PREVENTIVE EXAM: HCPCS | Performed by: INTERNAL MEDICINE

## 2024-09-26 PROCEDURE — G0008 ADMIN INFLUENZA VIRUS VAC: HCPCS | Performed by: INTERNAL MEDICINE

## 2024-09-26 PROCEDURE — 99214 OFFICE O/P EST MOD 30 MIN: CPT | Mod: 25 | Performed by: INTERNAL MEDICINE

## 2024-09-26 PROCEDURE — 90662 IIV NO PRSV INCREASED AG IM: CPT | Performed by: INTERNAL MEDICINE

## 2024-09-26 PROCEDURE — G0009 ADMIN PNEUMOCOCCAL VACCINE: HCPCS | Performed by: INTERNAL MEDICINE

## 2024-09-26 RX ORDER — AMLODIPINE AND VALSARTAN 5; 160 MG/1; MG/1
1 TABLET ORAL DAILY
Qty: 90 TABLET | Refills: 3 | Status: SHIPPED | OUTPATIENT
Start: 2024-09-26

## 2024-09-26 RX ORDER — METFORMIN HCL 500 MG
500 TABLET, EXTENDED RELEASE 24 HR ORAL 2 TIMES DAILY
Qty: 120 TABLET | Refills: 5 | Status: SHIPPED | OUTPATIENT
Start: 2024-09-26

## 2024-09-26 RX ORDER — ATORVASTATIN CALCIUM 20 MG/1
20 TABLET, FILM COATED ORAL DAILY
Qty: 90 TABLET | Refills: 3 | Status: SHIPPED | OUTPATIENT
Start: 2024-09-26

## 2024-09-26 NOTE — PROGRESS NOTES
Preventive Care Visit  [facility]  Easton Correa MD, Internal Medicine  Sep 26, 2024      Assessment & Plan   Problem List Items Addressed This Visit          Endocrine    Dyslipidemia    Relevant Medications    atorvastatin (LIPITOR) 20 MG tablet    Type 2 diabetes mellitus without complication, without long-term current use of insulin (H)     controlled  hypoglycemic medications: metformin ER 1000mg daily  insulin therapy:  last HbA1c: 6.3%   microvascular complications: none known  macrovascular complications: none known  ASA/KAVYA/statin:  ASA 81mg daily, atorvastatin 20mg qhs         Relevant Medications    metFORMIN (GLUCOPHAGE XR) 500 MG 24 hr tablet       Circulatory    Primary hypertension     Controlled (significant whitecoat component)  current antihypertensive regimen: amlodipine/valsartan 5/160mg daily   regimen changes:  intolerance:  future titration/work-up plan:          Relevant Medications    amLODIPine-valsartan (EXFORGE) 5-160 MG tablet       Behavioral    Recurrent depressive disorder (H)       Other    Personal history of malignant neoplasm of breast     Prior bilateral mastectomy with implants; 2012         Health care maintenance     - colonoscopy 10/2020 (family h/o CRC); reassess at age 66  - h/o bilateral mastectomy  - schedule DEXA  - last PAP 10/2018  - adult vaccination discussed and updated accordingly         Relevant Orders    REVIEW OF HEALTH MAINTENANCE PROTOCOL ORDERS (Completed)     Other Visit Diagnoses       Need for vaccination    -  Primary    Relevant Orders    INFLUENZA HIGH DOSE, TRIVALENT, PF (FLUZONE) (Completed)    Pneumococcal 20 Valent Conjugate (PCV20) (Completed)    Benign essential hypertension        Relevant Medications    amLODIPine-valsartan (EXFORGE) 5-160 MG tablet    Screening for osteoporosis        Relevant Orders    DEXA HIP/PELVIS/SPINE - Future    Asymptomatic menopausal state        Relevant Orders    DEXA HIP/PELVIS/SPINE -  "Future                    BMI  Estimated body mass index is 30.3 kg/m  as calculated from the following:    Height as of this encounter: 1.499 m (4' 11\").    Weight as of this encounter: 68 kg (150 lb).       Counseling  Appropriate preventive services were addressed with this patient via screening, questionnaire, or discussion as appropriate for fall prevention, nutrition, physical activity, Tobacco-use cessation, social engagement, weight loss and cognition.  Checklist reviewing preventive services available has been given to the patient.  Reviewed patient's diet, addressing concerns and/or questions.   She is at risk for lack of exercise and has been provided with information to increase physical activity for the benefit of her well-being.     FUTURE APPOINTMENTS:       - Follow-up visit in 12 months      Lavinia Bella is a 64 year old, presenting for the following: Returns to clinic; now enrolled in Medicare.  Has dealt with loss of mother-in-law - dealing with house/estate clean-up.  No significant health changes.  Feels well.  Daughter works at [employer].  Medicare Visit        9/26/2024     5:06 PM   Additional Questions   Roomed by Katarina RODRIGUEZ        Health Care Directive  Patient does not have a Health Care Directive or Living Will: Discussed advance care planning with patient; information given to patient to review.    HPI        9/25/2024   General Health   How would you rate your overall physical health? Excellent   Feel stress (tense, anxious, or unable to sleep) Not at all            9/25/2024   Nutrition   Diet: Carbohydrate counting            9/25/2024   Exercise   Days per week of moderate/strenous exercise 3 days   Average minutes spent exercising at this level 20 min            9/25/2024   Social Factors   Frequency of gathering with friends or relatives Twice a week   Worry food won't last until get money to buy more No   Food not last or not have enough money for food? No   Do you have " housing? (Housing is defined as stable permanent housing and does not include staying ouside in a car, in a tent, in an abandoned building, in an overnight shelter, or couch-surfing.) Yes   Are you worried about losing your housing? No   Lack of transportation? No   Unable to get utilities (heat,electricity)? No            9/25/2024   Fall Risk   Fallen 2 or more times in the past year? No    No   Trouble with walking or balance? No    No       Multiple values from one day are sorted in reverse-chronological order          9/25/2024   Activities of Daily Living- Home Safety   Needs help with the following daily activites None of the above   Safety concerns in the home None of the above            9/25/2024   Dental   Dentist two times every year? Yes            9/25/2024   Hearing Screening   Hearing concerns? None of the above            9/25/2024   Driving Risk Screening   Patient/family members have concerns about driving No            9/25/2024   General Alertness/Fatigue Screening   Have you been more tired than usual lately? No            9/25/2024   Urinary Incontinence Screening   Bothered by leaking urine in past 6 months No            9/25/2024   TB Screening   Were you born outside of the [place]? No          Today's PHQ-9 Score:       9/25/2024     2:44 PM   PHQ-9 SCORE   PHQ-9 Total Score MyChart 0   PHQ-9 Total Score 0         9/25/2024   Substance Use   Alcohol more than 3/day or more than 7/wk No   Do you have a current opioid prescription? No   How severe/bad is pain from 1 to 10? 0/10 (No Pain)   Do you use any other substances recreationally? No        Social History     Tobacco Use    Smoking status: Never    Smokeless tobacco: Never    Tobacco comments:     None   Vaping Use    Vaping status: Never Used   Substance Use Topics    Alcohol use: Not Currently    Drug use: Never           9/28/2023   LAST FHS-7 RESULTS   1st degree relative breast or ovarian cancer No   Any relative bilateral breast  cancer No   Any male have breast cancer No   Any ONE woman have BOTH breast AND ovarian cancer No   Any woman with breast cancer before 50yrs No   2 or more relatives with breast AND/OR ovarian cancer No   2 or more relatives with breast AND/OR bowel cancer No           Mammogram Screening - Mammogram every 1-2 years updated in Health Maintenance based on mutual decision making      History of abnormal Pap smear: No - age 65 or older with adequate negative prior screening test results (3 consecutive negative cytology results, 2 consecutive negative cotesting results, or 2 consecutive negative HrHPV test results within 10 years, with the most recent test occurring within the recommended screening interval for the test used)       ASCVD Risk   The 10-year ASCVD risk score (Yusef FRANCO, et al., 2019) is: 18.1%    Values used to calculate the score:      Age: 64 years      Sex: Female      Is Non- : No      Diabetic: Yes      Tobacco smoker: No      Systolic Blood Pressure: 161 mmHg      Is BP treated: Yes      HDL Cholesterol: 56 mg/dL      Total Cholesterol: 146 mg/dL          Reviewed and updated as needed this visit by Provider                    Current providers sharing in care for this patient include:  Patient Care Team:  Easton Correa MD as PCP - General (Internal Medicine)  Easton Correa MD as Assigned PCP    The following health maintenance items are reviewed in [employer] and correct as of today:  Health Maintenance   Topic Date Due    DEXA  Never done    DEPRESSION ACTION PLAN  Never done    Pneumococcal Vaccine: 65+ Years (1 of 2 - PCV) Never done    ZOSTER IMMUNIZATION (1 of 2) Never done    RSV VACCINE (1 - Risk 60-74 years 1-dose series) Never done    INFLUENZA VACCINE (1) 09/01/2024    COVID-19 Vaccine (5 - 2024-25 season) 09/01/2024    MEDICARE ANNUAL WELLNESS VISIT  09/28/2024    DIABETIC FOOT EXAM  09/28/2024    ANNUAL REVIEW OF HM ORDERS  09/28/2024    A1C   "12/21/2024    PHQ-9  03/26/2025    EYE EXAM  09/05/2025    BMP  09/21/2025    LIPID  09/21/2025    MICROALBUMIN  09/21/2025    FALL RISK ASSESSMENT  09/26/2025    ADVANCE CARE PLANNING  09/30/2027    COLORECTAL CANCER SCREENING  08/21/2030    DTAP/TDAP/TD IMMUNIZATION (7 - Td or Tdap) 02/26/2031    HPV IMMUNIZATION  Aged Out    MENINGITIS IMMUNIZATION  Aged Out    RSV MONOCLONAL ANTIBODY  Aged Out    HEPATITIS C SCREENING  Discontinued    HIV SCREENING  Discontinued    MAMMO SCREENING  Discontinued    PAP  Discontinued         Review of Systems  Constitutional, HEENT, cardiovascular, pulmonary, gi and gu systems are negative, except as otherwise noted.     Objective    Exam  BP (!) 161/86   Pulse 79   Temp 97.7  F (36.5  C)   Resp 16   Ht 1.499 m (4' 11\")   Wt 68 kg (150 lb)   SpO2 96%   BMI 30.30 kg/m     Estimated body mass index is 30.3 kg/m  as calculated from the following:    Height as of this encounter: 1.499 m (4' 11\").    Weight as of this encounter: 68 kg (150 lb).    Physical Exam  GENERAL: alert and no distress  NECK: no adenopathy, no asymmetry, masses, or scars  RESP: lungs clear to auscultation - no rales, rhonchi or wheezes  CV: regular rate and rhythm, normal S1 S2, no S3 or S4, no murmur, click or rub, no peripheral edema  ABDOMEN: soft, nontender, no hepatosplenomegaly, no masses and bowel sounds normal  MS: no gross musculoskeletal defects noted, no edema        9/26/2024   Mini Cog   Mini-Cog Not Completed (choose reason) Patient declines          Patient declines, there are NO concerns for cognitive deficits.      Vision Screen  Vision Screen Results: Pass  9/26/2024            Vision Screening Results   RIGHT EYE 10/12.5 (20/25)   LEFT EYE 10/10 (20/20)   Is there a two line difference? YES Abnormal    Vision Screen Results Pass     Details        Signed Electronically by: Easton Correa MD    Answers submitted by the patient for this visit:  Patient Health Questionnaire (Submitted on " 9/25/2024)  If you checked off any problems, how difficult have these problems made it for you to do your work, take care of things at home, or get along with other people?: Not difficult at all  PHQ9 TOTAL SCORE: 0

## 2024-09-26 NOTE — ASSESSMENT & PLAN NOTE
- colonoscopy 10/2020 (family h/o CRC); reassess at age 66  - h/o bilateral mastectomy  - schedule DEXA  - last PAP 10/2018  - adult vaccination discussed and updated accordingly

## 2024-09-26 NOTE — ASSESSMENT & PLAN NOTE
controlled  hypoglycemic medications: metformin ER 1000mg daily  insulin therapy:  last HbA1c: 6.3%   microvascular complications: none known  macrovascular complications: none known  ASA/KAVYA/statin:  ASA 81mg daily, atorvastatin 20mg qhs

## 2025-01-19 ENCOUNTER — HEALTH MAINTENANCE LETTER (OUTPATIENT)
Age: 65
End: 2025-01-19

## 2025-04-15 ENCOUNTER — OFFICE VISIT (OUTPATIENT)
Dept: FAMILY MEDICINE | Facility: CLINIC | Age: 65
End: 2025-04-15
Payer: MEDICARE

## 2025-04-15 ENCOUNTER — NURSE TRIAGE (OUTPATIENT)
Dept: FAMILY MEDICINE | Facility: CLINIC | Age: 65
End: 2025-04-15

## 2025-04-15 VITALS
SYSTOLIC BLOOD PRESSURE: 138 MMHG | RESPIRATION RATE: 16 BRPM | OXYGEN SATURATION: 99 % | DIASTOLIC BLOOD PRESSURE: 80 MMHG | BODY MASS INDEX: 29.96 KG/M2 | HEART RATE: 98 BPM | WEIGHT: 148.6 LBS | TEMPERATURE: 98.4 F | HEIGHT: 59 IN

## 2025-04-15 DIAGNOSIS — S05.02XA ABRASION OF LEFT CORNEA, INITIAL ENCOUNTER: Primary | ICD-10-CM

## 2025-04-15 PROCEDURE — 3079F DIAST BP 80-89 MM HG: CPT | Performed by: NURSE PRACTITIONER

## 2025-04-15 PROCEDURE — 3075F SYST BP GE 130 - 139MM HG: CPT | Performed by: NURSE PRACTITIONER

## 2025-04-15 PROCEDURE — 99213 OFFICE O/P EST LOW 20 MIN: CPT | Performed by: NURSE PRACTITIONER

## 2025-04-15 RX ORDER — ALBUTEROL SULFATE 90 UG/1
INHALANT RESPIRATORY (INHALATION) PRN
COMMUNITY
Start: 2024-11-24

## 2025-04-15 RX ORDER — POLYMYXIN B SULFATE AND TRIMETHOPRIM 1; 10000 MG/ML; [USP'U]/ML
1 SOLUTION OPHTHALMIC EVERY 4 HOURS
Qty: 10 ML | Refills: 0 | Status: SHIPPED | OUTPATIENT
Start: 2025-04-15 | End: 2025-04-22

## 2025-04-15 ASSESSMENT — PATIENT HEALTH QUESTIONNAIRE - PHQ9
10. IF YOU CHECKED OFF ANY PROBLEMS, HOW DIFFICULT HAVE THESE PROBLEMS MADE IT FOR YOU TO DO YOUR WORK, TAKE CARE OF THINGS AT HOME, OR GET ALONG WITH OTHER PEOPLE: NOT DIFFICULT AT ALL
SUM OF ALL RESPONSES TO PHQ QUESTIONS 1-9: 0
SUM OF ALL RESPONSES TO PHQ QUESTIONS 1-9: 0

## 2025-04-15 ASSESSMENT — ENCOUNTER SYMPTOMS: EYE PAIN: 1

## 2025-04-15 ASSESSMENT — VISUAL ACUITY: OU: 1

## 2025-04-15 NOTE — PROGRESS NOTES
"  Assessment & Plan     Abrasion of left cornea, initial encounter  Fluorescein exam reveals corneal abrasion.  Eyelid everted and no foreign body identified.  Recommend she continue dry eyedrops and will add on antibiotic eyedrops as below.  No vision changes, mild pain and improving.  Recommend follow-up if symptoms do not continue to improve for the next 48 to 72 hours or she has new or concerning symptoms.  - polymixin b-trimethoprim (POLYTRIM) 05146-6.1 UNIT/ML-% ophthalmic solution; Place 1 drop Into the left eye every 4 hours for 7 days.          BMI  Estimated body mass index is 30.01 kg/m  as calculated from the following:    Height as of this encounter: 1.499 m (4' 11\").    Weight as of this encounter: 67.4 kg (148 lb 9.6 oz).             Subjective   Jena is a 64 year old, presenting for the following health issues:  Eye Problem (Lt Eye injury, poss branch hit her eye yesterday)        4/15/2025     5:08 PM   Additional Questions   Roomed by ROBERT Zaragoza     A branch possibly hit the left inner corner of eye   Tried ice packs and eye drops  Still painful, but improving  No vision changes   No headache  No glasses or contacts   More dry  No discharge, eye not matted shut in morning  No itching  No periorbital swelling or redness.     History of Present Illness       Reason for visit:  Eye injured,  something out of air, felt like a tree twig hit my left eye.  Want to treat to prevent an eye infection.  Symptom onset:  1-3 days ago  Symptoms include:  Corner of eye is red, guessing where the ?  hit my eye.  Symptom intensity:  Mild  Symptom progression:  Staying the same  Had these symptoms before:  No  What makes it better:  Cold compress   She is taking medications regularly.                  Review of Systems  Constitutional, HEENT, cardiovascular, pulmonary, gi and gu systems are negative, except as otherwise noted.      Objective    /80 (BP Location: Right arm, Patient Position: Sitting, Cuff Size: " "Adult Regular)   Pulse 98   Temp 98.4  F (36.9  C) (Tympanic)   Resp 16   Ht 1.499 m (4' 11\")   Wt 67.4 kg (148 lb 9.6 oz)   SpO2 99%   BMI 30.01 kg/m    Body mass index is 30.01 kg/m .  Physical Exam  Constitutional:       General: She is not in acute distress.     Appearance: Normal appearance.   HENT:      Head: Normocephalic and atraumatic.   Eyes:      General: Lids are everted, no foreign bodies appreciated. Vision grossly intact. Gaze aligned appropriately. No visual field deficit.        Right eye: No discharge.         Left eye: No discharge.      Extraocular Movements: Extraocular movements intact.      Conjunctiva/sclera:      Left eye: Left conjunctiva is injected. No chemosis or exudate.     Pupils: Pupils are equal, round, and reactive to light.        Comments: Left eye corneal abrasion    Neurological:      Mental Status: She is alert.                    Signed Electronically by: CLOVER Rodriguez CNP    "

## 2025-04-15 NOTE — TELEPHONE ENCOUNTER
S-(situation): Eye irritation     B-(background): Patient was walking to car last night, very windy outside and small piece of twig flew at patients eye, hitting the inner corner.     A-(assessment): Patient rates pain mild to moderate, the foreign body that hit her eye is no longer in her eye. She states that it is red in the corner and sore. No pus or drainage. No vision changes. Patient is using cold pack on eye, and eye drops for dry eyes. She does not wear contacts or glasses.     R-(recommendations): RN huddled with Tisha Dasilva. Per Haroldo LATIF to be seen in clinic today. Patient is scheduled for clinic appointment this afternoon. Advised Urgent Care sooner than appointment if pain becomes more severe, vision changes, fever, drainage.     Reason for Disposition   Redness of white of eye (sclera) AND doesn't sound like foreign body in the eye   MODERATE eye pain or discomfort (e.g., interferes with normal activities or awakens from sleep; more than mild)    Additional Information   Negative: Chemical got in the eye   Negative: Piece of something got in the eye   Negative: Followed an eye injury   Negative: White, yellow, or green pus in the eyes   Negative: Eyelashes stuck together (matting) from pus   Negative: Eyelid swelling and no redness of white of eye (sclera)   Negative: Caused by pollen or other allergy OR main symptom is itchy eyes   Negative: Suspected reaction to antibiotic eye drops   Negative: Red, widespread rash also present   Negative: SEVERE eye pain (e.g., excruciating pain and patient unable to do normal activities)   Negative: Eyelid is very swollen (shut or almost) and fever   Negative: Recent eye surgery and has increasing eye pain   Negative: Patient sounds very sick or weak to the triager    Protocols used: Eye - Foreign Body-A-OH, Eye - Redness-A-OH

## 2025-04-27 ENCOUNTER — HEALTH MAINTENANCE LETTER (OUTPATIENT)
Age: 65
End: 2025-04-27

## 2025-06-15 ENCOUNTER — OFFICE VISIT (OUTPATIENT)
Dept: URGENT CARE | Facility: URGENT CARE | Age: 65
End: 2025-06-15
Payer: MEDICARE

## 2025-06-15 VITALS
DIASTOLIC BLOOD PRESSURE: 69 MMHG | OXYGEN SATURATION: 98 % | BODY MASS INDEX: 30.09 KG/M2 | WEIGHT: 149 LBS | SYSTOLIC BLOOD PRESSURE: 160 MMHG | HEART RATE: 103 BPM | RESPIRATION RATE: 16 BRPM | TEMPERATURE: 98.4 F

## 2025-06-15 DIAGNOSIS — Z98.890 S/P SKIN BIOPSY: ICD-10-CM

## 2025-06-15 DIAGNOSIS — L03.114 CELLULITIS OF LEFT UPPER EXTREMITY: Primary | ICD-10-CM

## 2025-06-15 PROCEDURE — 3078F DIAST BP <80 MM HG: CPT | Performed by: PHYSICIAN ASSISTANT

## 2025-06-15 PROCEDURE — 3077F SYST BP >= 140 MM HG: CPT | Performed by: PHYSICIAN ASSISTANT

## 2025-06-15 PROCEDURE — 99213 OFFICE O/P EST LOW 20 MIN: CPT | Performed by: PHYSICIAN ASSISTANT

## 2025-06-15 RX ORDER — CEFADROXIL 500 MG/1
500 CAPSULE ORAL 2 TIMES DAILY
Qty: 14 CAPSULE | Refills: 0 | Status: SHIPPED | OUTPATIENT
Start: 2025-06-15 | End: 2025-06-22

## 2025-06-15 NOTE — PROGRESS NOTES
Patient presents with:  Derm Problem: Had biopsy on left forearm for abnormal mole 6 days ago. Concerned about redness and slight swelling. Yellow drainage.       Clinical Decision Making:  Treatment for cellulitis after the shave biopsy of the left torso.  Ulnar side of the forearm.  Patient history of Duricef and dressings were changed in the office to include Xeroform 4 x 4 and roll Coban. Expected course of resolution and indication for return was gone over and questions were answered to patient/parent's satisfaction before discharge.        ICD-10-CM    1. Cellulitis of left upper extremity  L03.114 cefadroxil (DURICEF) 500 MG capsule      2. S/P skin biopsy  Z98.890 cefadroxil (DURICEF) 500 MG capsule          Patient Instructions   The dressings on clean and dry for 3 days  Change the dressings if they get wet or dirty      Hot packs to the area 3 times per day 20 minutes on each hour.  Do not fall asleep with the hot packs on the skin.  Take the antibiotic as written  Symptoms should not be getting worse over the next 24 hours after taking the antibiotic.  Should have start of improvement after 48 hours on the antibiotic  Use of probiotics to help prevent diarrhea.  Separate dosing of the antibiotic from the probiotic by 2 hours or they are not in the stomach at the same time.  Use of over-the-counter yogurt for helping with stomach upset and diarrhea.    Return to dermatology clinic if not getting good resolution or if new symptoms or concerns arise.      HPI:  Jena Valencia is a 64 year old female who presents today for concern to the shave biopsy site of the left dorsal forearm.  Patient was seen by [facility] dermatology on 6/9/2025 had a shave biopsy from the left dorsal forearm.  Shares that she has had redness and swelling around the shave biopsy site.  Has not had drainage.  She has applied topical Aquaphor to the area for moisturizing.    History obtained from chart review and the patient.    Problem  List:  2022-09: Allergic rhinitis  2022-09: Dyslipidemia  2022-09: Primary hypertension  2022-09: Malignant neoplasm of breast (H)  2022-09: Obesity  2022-09: Recurrent depressive disorder  2022-09: Type 2 diabetes mellitus without complication, without long-  term current use of insulin (H)  2022-09: Personal history of malignant neoplasm of breast  2022-09: Health care maintenance      Past Medical History:   Diagnosis Date    Malignant neoplasm of breast (H) 9/26/2022       Social History     Tobacco Use    Smoking status: Never     Passive exposure: Never    Smokeless tobacco: Never    Tobacco comments:     None   Substance Use Topics    Alcohol use: Not Currently       Review of Systems  As above in HPI otherwise negative.    Vitals:    06/15/25 0917   BP: (!) 160/69   BP Location: Right arm   Patient Position: Sitting   Pulse: 103   Resp: 16   Temp: 98.4  F (36.9  C)   TempSrc: Tympanic   SpO2: 98%   Weight: 67.6 kg (149 lb)       General: Patient is resting comfortably no acute distress is afebrile  HEENT: Head is normocephalic atraumatic   eyes are PERRL EOMI sclera anicteric   Skin: With section of the shave biopsy site on the left dorsal forearm shows that there is erythema mild swelling and tenderness to palpation but no drainage.  Please see medical images in chart below    Physical Exam          At the end of the encounter, I discussed results, diagnosis, medications. Discussed red flags for immediate return to clinic/ER, as well as indications for follow up if no improvement. Patient understood and agreed to plan. Patient was stable for discharge.

## 2025-06-15 NOTE — PATIENT INSTRUCTIONS
The dressings on clean and dry for 3 days  Change the dressings if they get wet or dirty      Hot packs to the area 3 times per day 20 minutes on each hour.  Do not fall asleep with the hot packs on the skin.  Take the antibiotic as written  Symptoms should not be getting worse over the next 24 hours after taking the antibiotic.  Should have start of improvement after 48 hours on the antibiotic  Use of probiotics to help prevent diarrhea.  Separate dosing of the antibiotic from the probiotic by 2 hours or they are not in the stomach at the same time.  Use of over-the-counter yogurt for helping with stomach upset and diarrhea.    Return to dermatology clinic if not getting good resolution or if new symptoms or concerns arise.

## 2025-06-15 NOTE — PROGRESS NOTES
Urgent Care Clinic Visit    Chief Complaint   Patient presents with    Derm Problem     Had biopsy on left forearm for abnormal mole 6 days ago. Concerned about redness and slight swelling. Yellow drainage.               6/15/2025     9:17 AM   Additional Questions   Roomed by angie   Accompanied by self

## 2025-08-10 ENCOUNTER — HEALTH MAINTENANCE LETTER (OUTPATIENT)
Age: 65
End: 2025-08-10

## 2025-08-22 ENCOUNTER — ANCILLARY PROCEDURE (OUTPATIENT)
Dept: BONE DENSITY | Facility: CLINIC | Age: 65
End: 2025-08-22
Attending: INTERNAL MEDICINE
Payer: MEDICARE

## 2025-08-22 DIAGNOSIS — Z78.0 ASYMPTOMATIC MENOPAUSAL STATE: ICD-10-CM

## 2025-08-22 DIAGNOSIS — Z13.820 SCREENING FOR OSTEOPOROSIS: ICD-10-CM

## 2025-08-22 PROCEDURE — 77080 DXA BONE DENSITY AXIAL: CPT | Mod: TC | Performed by: PHYSICIAN ASSISTANT

## 2025-08-22 PROCEDURE — 77091 TBS TECHL CALCULATION ONLY: CPT | Performed by: PHYSICIAN ASSISTANT
